# Patient Record
Sex: FEMALE | Race: WHITE | NOT HISPANIC OR LATINO | Employment: OTHER | URBAN - METROPOLITAN AREA
[De-identification: names, ages, dates, MRNs, and addresses within clinical notes are randomized per-mention and may not be internally consistent; named-entity substitution may affect disease eponyms.]

---

## 2022-02-23 ENCOUNTER — OFFICE VISIT (OUTPATIENT)
Dept: FAMILY MEDICINE CLINIC | Facility: CLINIC | Age: 67
End: 2022-02-23

## 2022-02-23 ENCOUNTER — TELEPHONE (OUTPATIENT)
Dept: FAMILY MEDICINE CLINIC | Facility: CLINIC | Age: 67
End: 2022-02-23

## 2022-02-23 VITALS
HEIGHT: 62 IN | WEIGHT: 146 LBS | DIASTOLIC BLOOD PRESSURE: 82 MMHG | RESPIRATION RATE: 18 BRPM | BODY MASS INDEX: 26.87 KG/M2 | HEART RATE: 64 BPM | OXYGEN SATURATION: 96 % | SYSTOLIC BLOOD PRESSURE: 138 MMHG | TEMPERATURE: 97.5 F

## 2022-02-23 DIAGNOSIS — G43.009 MIGRAINE WITHOUT AURA AND WITHOUT STATUS MIGRAINOSUS, NOT INTRACTABLE: ICD-10-CM

## 2022-02-23 DIAGNOSIS — E78.2 MIXED HYPERLIPIDEMIA: ICD-10-CM

## 2022-02-23 DIAGNOSIS — G89.4 CHRONIC PAIN SYNDROME: ICD-10-CM

## 2022-02-23 DIAGNOSIS — G89.29 CHRONIC BILATERAL LOW BACK PAIN WITHOUT SCIATICA: ICD-10-CM

## 2022-02-23 DIAGNOSIS — Z00.00 ENCOUNTER FOR MEDICAL EXAMINATION TO ESTABLISH CARE: ICD-10-CM

## 2022-02-23 DIAGNOSIS — I10 PRIMARY HYPERTENSION: Primary | ICD-10-CM

## 2022-02-23 DIAGNOSIS — Z00.00 ENCOUNTER FOR PREVENTIVE CARE: ICD-10-CM

## 2022-02-23 DIAGNOSIS — N18.31 STAGE 3A CHRONIC KIDNEY DISEASE: ICD-10-CM

## 2022-02-23 DIAGNOSIS — M54.50 CHRONIC BILATERAL LOW BACK PAIN WITHOUT SCIATICA: ICD-10-CM

## 2022-02-23 PROCEDURE — 99203 OFFICE O/P NEW LOW 30 MIN: CPT | Performed by: STUDENT IN AN ORGANIZED HEALTH CARE EDUCATION/TRAINING PROGRAM

## 2022-02-23 RX ORDER — ROSUVASTATIN CALCIUM 5 MG/1
1 TABLET, COATED ORAL DAILY
COMMUNITY
Start: 2022-02-04 | End: 2022-05-05 | Stop reason: SDUPTHER

## 2022-02-23 RX ORDER — MAGNESIUM 30 MG
30 TABLET ORAL
COMMUNITY

## 2022-02-23 RX ORDER — HYDROCHLOROTHIAZIDE 25 MG/1
1 TABLET ORAL DAILY
COMMUNITY
Start: 2022-02-04 | End: 2022-05-05 | Stop reason: SDUPTHER

## 2022-02-23 RX ORDER — HYDROCODONE BITARTRATE AND ACETAMINOPHEN 5; 325 MG/1; MG/1
1 TABLET ORAL EVERY 8 HOURS PRN
COMMUNITY
Start: 2022-02-04 | End: 2022-05-05 | Stop reason: SDUPTHER

## 2022-02-23 NOTE — TELEPHONE ENCOUNTER
Caller: Connie Posey    Relationship: Self    Best call back number: [phone]      What was the call regarding: PATIENT WANTED TO  TO KNOW LAST COLONOSCOPY WAS IN 2017.     Do you require a callback: IF ANY QUESTIONS.

## 2022-02-23 NOTE — TELEPHONE ENCOUNTER
Dr. Chamorro,    Please see patient message below. Let me know if you have any additional questions.      Thanks,  Rick

## 2022-02-23 NOTE — PROGRESS NOTES
Chief Complaint  Pt presented to clinic with   Chief Complaint   Patient presents with   • Establish Care     New Pt           History of Present Illness  Ms. Connie Posey 66-year-old female who presented to the clinic for the first time for establishing medical care.  Patient reported she is originally from [place] and has PCP Dr. Anderson at [place] but due to her grandkids she often visit [place] and would like to establish another doctor here in [place].  Patient reported she has multiple conditions which includes hypertension, cholesterol problems, low back pain and for which she is on hydrocodone as needed.  Patient reported she gets this medication from Dr. Anderson and usually takes medication as needed so she has enough for now and then after few months she is going back to [place] and will see her PCP the ER for schedule 6-month follow-up.  Patient reported she recently recovered from acute bronchitis but still has nasal congestion and for that she is using vicks, Allegra, Mucinex over-the-counter but still has that congestion.  Review of Systems   Constitutional: Negative for activity change, appetite change, fatigue, fever and unexpected weight change.   HENT: Positive for congestion. Negative for postnasal drip, rhinorrhea, sinus pressure, sore throat and voice change.    Respiratory: Negative for cough, chest tightness, shortness of breath and wheezing.    Cardiovascular: Negative for chest pain and palpitations.   Gastrointestinal: Negative for abdominal distention, abdominal pain, diarrhea, nausea and vomiting.   Genitourinary: Negative for difficulty urinating and dysuria.   Musculoskeletal: Positive for arthralgias and back pain. Negative for myalgias.   Skin: Negative for rash.   Neurological: Negative for dizziness, tremors and weakness.   Hematological: Negative for adenopathy.   Psychiatric/Behavioral: Negative for sleep disturbance. The patient is not  "nervous/anxious.        PMH:   Outpatient Medications Prior to Visit   Medication Sig Dispense Refill   • ASPIRIN 81 PO      • Black Pepper-Turmeric 3-500 MG capsule Take  by mouth.     • calcium citrate-vitamin d (CALCITRATE) 315-250 MG-UNIT tablet tablet      • Cyanocobalamin 1000 MCG sublingual tablet Place  under the tongue.     • hydroCHLOROthiazide (HYDRODIURIL) 25 MG tablet Take 1 tablet by mouth Daily.     • HYDROcodone-acetaminophen (NORCO) 5-325 MG per tablet Take 1 tablet by mouth Every 8 (Eight) Hours As Needed.     • magnesium 30 MG tablet Take 30 mg by mouth.     • Multiple Vitamins-Minerals (Eye Vitamins) capsule Take  by mouth.     • PROBIOTIC PRODUCT PO Take  by mouth.     • rosuvastatin (CRESTOR) 5 MG tablet Take 1 tablet by mouth Daily.       No facility-administered medications prior to visit.      Allergies   Allergen Reactions   • Tramadol Other (See Comments)     Not aware of this allergy, but she said her twin does have this reaction to tramadol  Other reaction(s): Chest Pain  Not aware of this allergy, but she said her twin does have this reaction to tramadol     • Sulfa Antibiotics Unknown - High Severity     Other reaction(s): Confusion  \"makes her feel weird\"   • Codeine Confusion     Makes her feel weird and dizzy  Other reaction(s): Confusion  Makes her feel weird and dizzy     • Nitrofurantoin Nausea Only     GI upset   • Propoxyphene Rash     Other reaction(s): Confusion  \"makes her feel weird\"     Past Surgical History:   Procedure Laterality Date   • CHOLECYSTECTOMY     • HYSTERECTOMY       family history includes COPD in her mother; Diabetes in her maternal grandmother; Heart disease in her father and mother; Hypertension in her mother.   reports that she has quit smoking. She has never used smokeless tobacco. She reports current alcohol use. No history on file for drug use.     /82   Pulse 64   Temp 97.5 °F (36.4 °C) (Temporal)   Resp 18   Ht 157.5 cm (62\")   Wt 66.2 kg " (146 lb)   SpO2 96%   BMI 26.70 kg/m²   Physical Exam  Vitals reviewed: recheck /80.   HENT:      Head: Normocephalic and atraumatic.      Mouth/Throat:      Mouth: Mucous membranes are moist.      Pharynx: Oropharynx is clear.   Eyes:      Extraocular Movements: Extraocular movements intact.      Conjunctiva/sclera: Conjunctivae normal.      Pupils: Pupils are equal, round, and reactive to light.   Cardiovascular:      Rate and Rhythm: Normal rate and regular rhythm.   Pulmonary:      Effort: Pulmonary effort is normal.      Breath sounds: Normal breath sounds.   Abdominal:      General: Bowel sounds are normal.      Palpations: Abdomen is soft.   Musculoskeletal:         General: Normal range of motion.      Cervical back: Neck supple.   Skin:     General: Skin is warm.      Capillary Refill: Capillary refill takes less than 2 seconds.   Neurological:      General: No focal deficit present.      Mental Status: She is alert and oriented to person, place, and time. Mental status is at baseline.   Psychiatric:         Mood and Affect: Mood normal.          Diagnoses and all orders for this visit:    1. Primary hypertension (Primary)  Comments:  aspirin 81 mg Po daily, continue hydrochlorthiazide 25 mg Po daily.  Orders:  -     CBC Auto Differential    2. Mixed hyperlipidemia  Comments:  on crestor 5 mg PO daily  Orders:  -     Lipid Panel With / Chol / HDL Ratio  -     Comprehensive Metabolic Panel    3. Chronic bilateral low back pain without sciatica  Comments:  takes hydrocodone 5/325 PO Q8hr as needed, pt is getting this medication from Dr Anderson( [place])    4. Chronic pain syndrome  Comments:  using heating pads and hydrocodone 5/325 mg PO Q8hr as needed for severe pain    5. Encounter for preventive care  Comments:  mammogram feb /2021, reported BiRADS2 , recommend every 2 year, due will be in 2023, due for colonoscopy.    6. Encounter for medical examination to establish care  Comments:  labs  ordered, will follow up.  Orders:  -     Hepatitis C Antibody    7. Stage 3a chronic kidney disease (HCC)  -     Vitamin D 25 Hydroxy    8. Migraine without aura and without status migrainosus, not intractable    Patient does not remember medication for her migraine but reported she has some medication at her home.  No controlled substance agreement was signed in this visit as patient reported she has enough medication for pain and she has a plan to go back to [place] for her 6-month follow-up with Dr. Anderson.  Basic lab work has been ordered today and mammogram was reviewed which was BI-RADS 2 done last year so patient is due for next mammogram next year in 2023.  As per the records last colonoscopy was done in 2006 and reported no polyp and recommended to repeat in 10 years.  As per the patient colonoscopy was done more recent than 2006 so patient will call the office after confirming her last colonoscopy and based on last colonoscopy report will order next colonoscopy.    Needs yearly Flu vaccine  Dental visit and exam every year  Seat Belt always when driving or riding cars  Safe sex life to avoid STD  Healthy heart diet  Exercise 3 times a week    Follow Up  2 weeks

## 2022-03-15 ENCOUNTER — TELEPHONE (OUTPATIENT)
Dept: INTERNAL MEDICINE | Facility: CLINIC | Age: 67
End: 2022-03-15

## 2022-03-15 NOTE — TELEPHONE ENCOUNTER
Caller: Connie Posey    Relationship: Self    Best call back number: [phone]    What is the best time to reach you: ANYTIME     Who are you requesting to speak with (clinical staff, provider,  specific staff member): DR. TAYLOR'S NURSE, NIRMALA    Do you know the name of the person who called:     What was the call regarding: HER UPCOMING VISIT    Do you require a callback: YES

## 2022-04-05 PROBLEM — H02.836 DERMATOCHALASIS OF BOTH EYELIDS: Status: ACTIVE | Noted: 2018-07-17

## 2022-04-05 PROBLEM — H25.10 NUCLEAR SCLEROTIC CATARACT: Status: ACTIVE | Noted: 2019-03-06

## 2022-04-05 PROBLEM — H43.813 PVD (POSTERIOR VITREOUS DETACHMENT), BILATERAL: Status: ACTIVE | Noted: 2018-07-17

## 2022-04-05 PROBLEM — H02.833 DERMATOCHALASIS OF BOTH EYELIDS: Status: ACTIVE | Noted: 2018-07-17

## 2022-04-05 RX ORDER — TERBINAFINE HYDROCHLORIDE 250 MG/1
250 TABLET ORAL
COMMUNITY
Start: 2021-12-13 | End: 2022-04-06

## 2022-04-06 ENCOUNTER — OFFICE VISIT (OUTPATIENT)
Dept: INTERNAL MEDICINE | Facility: CLINIC | Age: 67
End: 2022-04-06

## 2022-04-06 VITALS
RESPIRATION RATE: 16 BRPM | OXYGEN SATURATION: 95 % | BODY MASS INDEX: 26.91 KG/M2 | HEART RATE: 83 BPM | HEIGHT: 62 IN | DIASTOLIC BLOOD PRESSURE: 87 MMHG | SYSTOLIC BLOOD PRESSURE: 122 MMHG | WEIGHT: 146.2 LBS

## 2022-04-06 DIAGNOSIS — I10 BENIGN ESSENTIAL HYPERTENSION: Primary | Chronic | ICD-10-CM

## 2022-04-06 DIAGNOSIS — Z86.010 HISTORY OF COLON POLYPS: Chronic | ICD-10-CM

## 2022-04-06 DIAGNOSIS — Z51.81 THERAPEUTIC DRUG MONITORING: ICD-10-CM

## 2022-04-06 DIAGNOSIS — E78.2 MIXED HYPERLIPIDEMIA: Chronic | ICD-10-CM

## 2022-04-06 DIAGNOSIS — E53.8 VITAMIN B12 DEFICIENCY: Chronic | ICD-10-CM

## 2022-04-06 DIAGNOSIS — Z91.09 MULTIPLE ENVIRONMENTAL ALLERGIES: Chronic | ICD-10-CM

## 2022-04-06 DIAGNOSIS — E55.9 VITAMIN D DEFICIENCY: Chronic | ICD-10-CM

## 2022-04-06 DIAGNOSIS — J30.1 SEASONAL ALLERGIC RHINITIS DUE TO POLLEN: Chronic | ICD-10-CM

## 2022-04-06 DIAGNOSIS — Z11.59 NEED FOR HEPATITIS C SCREENING TEST: ICD-10-CM

## 2022-04-06 PROBLEM — Z86.0100 HISTORY OF COLON POLYPS: Status: ACTIVE | Noted: 2022-04-06

## 2022-04-06 PROBLEM — H02.836 DERMATOCHALASIS OF BOTH EYELIDS: Chronic | Status: ACTIVE | Noted: 2018-07-17

## 2022-04-06 PROBLEM — Z86.0100 HISTORY OF COLON POLYPS: Chronic | Status: ACTIVE | Noted: 2022-04-06

## 2022-04-06 PROBLEM — H43.813 PVD (POSTERIOR VITREOUS DETACHMENT), BILATERAL: Chronic | Status: ACTIVE | Noted: 2018-07-17

## 2022-04-06 PROBLEM — H02.833 DERMATOCHALASIS OF BOTH EYELIDS: Chronic | Status: ACTIVE | Noted: 2018-07-17

## 2022-04-06 PROBLEM — H25.13 NUCLEAR SCLEROTIC CATARACT OF BOTH EYES: Chronic | Status: ACTIVE | Noted: 2019-03-06

## 2022-04-06 PROBLEM — H25.13 NUCLEAR SCLEROTIC CATARACT OF BOTH EYES: Status: ACTIVE | Noted: 2019-03-06

## 2022-04-06 PROCEDURE — 99214 OFFICE O/P EST MOD 30 MIN: CPT | Performed by: INTERNAL MEDICINE

## 2022-04-06 NOTE — PROGRESS NOTES
04/06/2022    Patient Information  Connie Posey                                                                                          [address] [facility] [address]      1955  [unfilled]  There is no work phone number on file.    Chief Complaint:     New patient to get established.  Wants to discuss cholesterol and blood pressure as well as other issues.    History of Present Illness:    66-year-old white female patient is here to get established with me as her primary care physician/Internal Medicine physician.  She is not new to the system as she saw one of the other doctors in our group not long ago.  Lab work was scheduled but this was never done.  Patient is acquainted with 2 of my other patients and they made the referral.  She is moving here from [place].  She does not have any acute complaints at the present time.  She has high blood pressure and is treated with hydrochlorothiazide and reports that her blood pressure seems to go up and fluctuate when her weight goes up.  She also has  Hyperlipidemia for which she takes 5 mg of Crestor.  She is taking vitamin B12 sublingually but is not sure if there is a documented vitamin B12 deficiency.      Review of Systems   Constitutional: Negative.   HENT: Negative.    Eyes: Negative.    Cardiovascular: Negative.    Respiratory: Negative.    Endocrine: Negative.    Hematologic/Lymphatic: Negative.    Skin: Negative.    Musculoskeletal: Positive for arthritis and joint pain.   Gastrointestinal: Negative.    Genitourinary: Negative.    Neurological: Negative.    Psychiatric/Behavioral: Negative.    Allergic/Immunologic: Negative.        Active Problems:    Patient Active Problem List   Diagnosis   • Allergic rhinitis   • Chronic midline low back pain without sciatica   • Dermatochalasis of both eyelids   • Benign essential hypertension   • Microscopic hematuria   • Chronic migraine w/o aura w/o status migrainosus, not  intractable   • Hyperlipidemia   • Primary osteoarthritis of both hands   • Seborrheic dermatitis   • PVD (posterior vitreous detachment), bilateral   • Nuclear sclerotic cataract of both eyes   • Multiple environmental allergies   • Vitamin D deficiency   • Therapeutic drug monitoring   • Vitamin B12 deficiency   • History of colon polyps         Past Medical History:   Diagnosis Date   • Allergic rhinitis 12/17/2010   • Benign essential hypertension 12/17/2010    Formatting of this note might be different from the original. Hypertension Formatting of this note might be different from the original. Hypertension   • Chronic midline low back pain without sciatica 11/21/2013   • Chronic migraine w/o aura w/o status migrainosus, not intractable 9/16/2011   • Dermatochalasis of both eyelids 7/17/2018   • History of colon polyps 4/6/2022   • Hyperlipidemia 12/23/2010   • Microscopic hematuria 2/11/2011   • Nuclear sclerotic cataract of both eyes 3/6/2019   • Primary osteoarthritis of both hands 8/13/2010    Formatting of this note might be different from the original. Osteoarthritis Of The Hand - Bilateral  10/1 IMO update Formatting of this note might be different from the original. Osteoarthritis Of The Hand - Bilateral  10/1 IMO update   • PVD (posterior vitreous detachment), bilateral 7/17/2018   • Seborrheic dermatitis 12/17/2010    Formatting of this note might be different from the original. Seborrheic Dermatitis  10/1 IMO update Formatting of this note might be different from the original. Seborrheic Dermatitis  10/1 IMO update   • Vitamin B12 deficiency 4/6/2022   • Vitamin D deficiency 4/6/2022         Past Surgical History:   Procedure Laterality Date   • CHOLECYSTECTOMY N/A    • COLONOSCOPY W/ BIOPSIES N/A 03/17/2017 March 17, 2017--colonoscopy   • VAGINAL HYSTERECTOMY N/A     Age 30--partial vaginal hysterectomy.  Ovaries intact.         Allergies   Allergen Reactions   • Tramadol Other (See Comments)      "Not aware of this allergy, but she said her twin does have this reaction to tramadol  Other reaction(s): Chest Pain  Not aware of this allergy, but she said her twin does have this reaction to tramadol     • Sulfa Antibiotics Unknown - High Severity     Other reaction(s): Confusion  \"makes her feel weird\"   • Codeine Confusion     Makes her feel weird and dizzy  Other reaction(s): Confusion  Makes her feel weird and dizzy     • Nitrofurantoin Nausea Only     GI upset   • Propoxyphene Rash     Other reaction(s): Confusion  \"makes her feel weird\"           Current Outpatient Medications:   •  ASPIRIN 81 PO, , Disp: , Rfl:   •  Black Pepper-Turmeric 3-500 MG capsule, Take  by mouth., Disp: , Rfl:   •  Cyanocobalamin 1000 MCG sublingual tablet, Place  under the tongue., Disp: , Rfl:   •  hydroCHLOROthiazide (HYDRODIURIL) 25 MG tablet, Take 1 tablet by mouth Daily., Disp: , Rfl:   •  magnesium 30 MG tablet, Take 30 mg by mouth., Disp: , Rfl:   •  Multiple Vitamins-Minerals (Eye Vitamins) capsule, Take  by mouth., Disp: , Rfl:   •  PROBIOTIC PRODUCT PO, Take  by mouth., Disp: , Rfl:   •  rosuvastatin (CRESTOR) 5 MG tablet, Take 1 tablet by mouth Daily., Disp: , Rfl:   •  terbinafine (lamiSIL) 250 MG tablet, Take 250 mg by mouth., Disp: , Rfl:       Family History   Problem Relation Age of Onset   • Hypertension Mother    • COPD Mother    • Heart disease Mother    • Heart disease Father    • Diabetes Maternal Grandmother          Social History     Socioeconomic History   • Marital status: Single   Tobacco Use   • Smoking status: Former Smoker     Packs/day: 1.50     Years: 13.00     Pack years: 19.50     Start date: 1972     Quit date: 1985     Years since quittin.2   • Smokeless tobacco: Never Used   Vaping Use   • Vaping Use: Never used   Substance and Sexual Activity   • Alcohol use: Yes   • Drug use: Never   • Sexual activity: Defer         Vitals:    22 1212   BP: 122/87   BP Location: Right arm " "  Patient Position: Sitting   Cuff Size: Adult   Pulse: 83   Resp: 16   SpO2: 95%   Weight: 66.3 kg (146 lb 3.2 oz)   Height: 157.5 cm (62\")        Body mass index is 26.74 kg/m².      Physical Exam:    General: Alert and oriented x 3.  No acute distress.  Overweight.  Normal affect.  HEENT: Pupils equal, round, reactive to light; extraocular movements intact; sclerae nonicteric; pharynx, ear canals and TMs normal.  Neck: Without JVD, thyromegaly, bruit, or adenopathy.  Lungs: Clear to auscultation in all fields.  Heart: Regular rate and rhythm without murmur, rub, gallop, or click.  Abdomen: Soft, nontender, without hepatosplenomegaly or hernia.  Bowel sounds normal.  : Deferred.  Rectal: Deferred.  Extremities: Without clubbing, cyanosis, edema, or pulse deficit.  Neurologic: Intact without focal deficit.  Normal station and gait observed during ingress and egress from the examination room.  Skin: Without significant lesion.  Musculoskeletal: Unremarkable.    Lab/other results:      Assessment/Plan:     Diagnosis Plan   1. Benign essential hypertension  Comprehensive Metabolic Panel   2. Hyperlipidemia  CK    Comprehensive Metabolic Panel    NMR LipoProfile    TSH    T4, Free    T3, Free    Homocysteine    Folate   3. Multiple environmental allergies     4. Seasonal allergic rhinitis due to pollen     5. Vitamin D deficiency  Vitamin D 25 Hydroxy   6. History of colon polyps     7. Vitamin B12 deficiency  CBC (No Diff)    Methylmalonic Acid, Serum    Vitamin B12   8. Therapeutic drug monitoring  Urinalysis With Microscopic If Indicated (No Culture) - Urine, Clean Catch   9. Need for hepatitis C screening test  Hepatitis C Antibody     Patient has hypertension that does not appear to be controlled.  However, I am not going to make any changes at the present time based on this 1 reading.  She is on hydrochlorothiazide alone without potassium supplementation and certainly we need to assess her electrolyte status.  " She does not have any symptoms currently of hypokalemia.  She has hyperlipidemia that needs to be assessed with an NMR.  Her environmental allergies/allergic rhinitis seem to be reasonably controlled at present time.  Her vitamin D needs to be assessed given her postmenopausal status.  She needs a DEXA scan which we will schedule sometime here in the near future.  She also has a history of colon polyps and is due soon for a colonoscopy.  She is not sure if she should have this done in [place] by the previous doctor whether or not she wants to have someone here in [place] do it.  I explained to her that it is not something we have to make an immediate decision up on.    Plan is as follows: We will set up a follow-up appointment with fasting lab prior and this will also be a subsequent Medicare wellness visit.  Patient does not need any refills at this time.        Procedures

## 2022-04-28 ENCOUNTER — LAB (OUTPATIENT)
Dept: LAB | Facility: HOSPITAL | Age: 67
End: 2022-04-28

## 2022-04-28 DIAGNOSIS — Z11.59 NEED FOR HEPATITIS C SCREENING TEST: ICD-10-CM

## 2022-04-28 DIAGNOSIS — E55.9 VITAMIN D DEFICIENCY: Chronic | ICD-10-CM

## 2022-04-28 DIAGNOSIS — E53.8 VITAMIN B12 DEFICIENCY: Chronic | ICD-10-CM

## 2022-04-28 DIAGNOSIS — Z51.81 THERAPEUTIC DRUG MONITORING: ICD-10-CM

## 2022-04-28 DIAGNOSIS — E78.2 MIXED HYPERLIPIDEMIA: Chronic | ICD-10-CM

## 2022-04-28 DIAGNOSIS — I10 BENIGN ESSENTIAL HYPERTENSION: Chronic | ICD-10-CM

## 2022-04-28 LAB
25(OH)D3 SERPL-MCNC: 45 NG/ML (ref 30–100)
ALBUMIN SERPL-MCNC: 4.4 G/DL (ref 3.5–5.2)
ALBUMIN/GLOB SERPL: 1.4 G/DL
ALP SERPL-CCNC: 61 U/L (ref 39–117)
ALT SERPL W P-5'-P-CCNC: 15 U/L (ref 1–33)
ANION GAP SERPL CALCULATED.3IONS-SCNC: 11.3 MMOL/L (ref 5–15)
AST SERPL-CCNC: 13 U/L (ref 1–32)
BACTERIA UR QL AUTO: ABNORMAL /HPF
BILIRUB SERPL-MCNC: 0.9 MG/DL (ref 0–1.2)
BILIRUB UR QL STRIP: NEGATIVE
BUN SERPL-MCNC: 17 MG/DL (ref 8–23)
BUN/CREAT SERPL: 18.3 (ref 7–25)
CALCIUM SPEC-SCNC: 9.8 MG/DL (ref 8.6–10.5)
CHLORIDE SERPL-SCNC: 99 MMOL/L (ref 98–107)
CK SERPL-CCNC: 55 U/L (ref 20–180)
CLARITY UR: CLEAR
CO2 SERPL-SCNC: 31.7 MMOL/L (ref 22–29)
COLOR UR: YELLOW
CREAT SERPL-MCNC: 0.93 MG/DL (ref 0.57–1)
DEPRECATED RDW RBC AUTO: 38.9 FL (ref 37–54)
EGFRCR SERPLBLD CKD-EPI 2021: 67.1 ML/MIN/1.73
ERYTHROCYTE [DISTWIDTH] IN BLOOD BY AUTOMATED COUNT: 12.6 % (ref 12.3–15.4)
FOLATE SERPL-MCNC: 10.8 NG/ML (ref 4.78–24.2)
GLOBULIN UR ELPH-MCNC: 3.2 GM/DL
GLUCOSE SERPL-MCNC: 98 MG/DL (ref 65–99)
GLUCOSE UR STRIP-MCNC: NEGATIVE MG/DL
HCT VFR BLD AUTO: 46.1 % (ref 34–46.6)
HCV AB SER DONR QL: NORMAL
HCYS SERPL-MCNC: 9 UMOL/L (ref 0–15)
HGB BLD-MCNC: 14.9 G/DL (ref 12–15.9)
HGB UR QL STRIP.AUTO: ABNORMAL
HYALINE CASTS UR QL AUTO: ABNORMAL /LPF
KETONES UR QL STRIP: NEGATIVE
LEUKOCYTE ESTERASE UR QL STRIP.AUTO: NEGATIVE
MCH RBC QN AUTO: 27.3 PG (ref 26.6–33)
MCHC RBC AUTO-ENTMCNC: 32.3 G/DL (ref 31.5–35.7)
MCV RBC AUTO: 84.4 FL (ref 79–97)
NITRITE UR QL STRIP: NEGATIVE
PH UR STRIP.AUTO: 8 [PH] (ref 5–8)
PLATELET # BLD AUTO: 223 10*3/MM3 (ref 140–450)
PMV BLD AUTO: 9.4 FL (ref 6–12)
POTASSIUM SERPL-SCNC: 3.8 MMOL/L (ref 3.5–5.2)
PROT SERPL-MCNC: 7.6 G/DL (ref 6–8.5)
PROT UR QL STRIP: NEGATIVE
RBC # BLD AUTO: 5.46 10*6/MM3 (ref 3.77–5.28)
RBC # UR STRIP: ABNORMAL /HPF
REF LAB TEST METHOD: ABNORMAL
SODIUM SERPL-SCNC: 142 MMOL/L (ref 136–145)
SP GR UR STRIP: 1.02 (ref 1–1.03)
SQUAMOUS #/AREA URNS HPF: ABNORMAL /HPF
T3FREE SERPL-MCNC: 3.27 PG/ML (ref 2–4.4)
T4 FREE SERPL-MCNC: 1.44 NG/DL (ref 0.93–1.7)
TSH SERPL DL<=0.05 MIU/L-ACNC: 1.49 UIU/ML (ref 0.27–4.2)
UROBILINOGEN UR QL STRIP: ABNORMAL
VIT B12 BLD-MCNC: >2000 PG/ML (ref 211–946)
WBC # UR STRIP: ABNORMAL /HPF
WBC NRBC COR # BLD: 5.08 10*3/MM3 (ref 3.4–10.8)

## 2022-04-28 PROCEDURE — 85027 COMPLETE CBC AUTOMATED: CPT

## 2022-04-28 PROCEDURE — 81001 URINALYSIS AUTO W/SCOPE: CPT

## 2022-04-28 PROCEDURE — 84443 ASSAY THYROID STIM HORMONE: CPT

## 2022-04-28 PROCEDURE — 82550 ASSAY OF CK (CPK): CPT

## 2022-04-28 PROCEDURE — 82746 ASSAY OF FOLIC ACID SERUM: CPT

## 2022-04-28 PROCEDURE — 84481 FREE ASSAY (FT-3): CPT

## 2022-04-28 PROCEDURE — 80061 LIPID PANEL: CPT

## 2022-04-28 PROCEDURE — 80053 COMPREHEN METABOLIC PANEL: CPT

## 2022-04-28 PROCEDURE — 83921 ORGANIC ACID SINGLE QUANT: CPT

## 2022-04-28 PROCEDURE — 86803 HEPATITIS C AB TEST: CPT

## 2022-04-28 PROCEDURE — 82607 VITAMIN B-12: CPT

## 2022-04-28 PROCEDURE — 83090 ASSAY OF HOMOCYSTEINE: CPT

## 2022-04-28 PROCEDURE — 82306 VITAMIN D 25 HYDROXY: CPT

## 2022-04-28 PROCEDURE — 84439 ASSAY OF FREE THYROXINE: CPT

## 2022-04-28 PROCEDURE — 36415 COLL VENOUS BLD VENIPUNCTURE: CPT

## 2022-04-28 PROCEDURE — 83704 LIPOPROTEIN BLD QUAN PART: CPT

## 2022-04-30 LAB
CHOLEST SERPL-MCNC: 138 MG/DL (ref 100–199)
HDL SERPL-SCNC: 25.7 UMOL/L
HDLC SERPL-MCNC: 35 MG/DL
LDL SERPL QN: 20.7 NM
LDL SERPL-SCNC: 1190 NMOL/L
LDL SMALL SERPL-SCNC: 518 NMOL/L
LDLC SERPL CALC-MCNC: 82 MG/DL (ref 0–99)
TRIGL SERPL-MCNC: 116 MG/DL (ref 0–149)

## 2022-05-05 ENCOUNTER — OFFICE VISIT (OUTPATIENT)
Dept: INTERNAL MEDICINE | Facility: CLINIC | Age: 67
End: 2022-05-05

## 2022-05-05 VITALS
OXYGEN SATURATION: 96 % | SYSTOLIC BLOOD PRESSURE: 126 MMHG | HEART RATE: 93 BPM | BODY MASS INDEX: 25.32 KG/M2 | RESPIRATION RATE: 18 BRPM | DIASTOLIC BLOOD PRESSURE: 72 MMHG | HEIGHT: 62 IN | WEIGHT: 137.6 LBS

## 2022-05-05 DIAGNOSIS — Z00.00 ENCOUNTER FOR SUBSEQUENT ANNUAL WELLNESS VISIT (AWV) IN MEDICARE PATIENT: Primary | ICD-10-CM

## 2022-05-05 DIAGNOSIS — M19.042 PRIMARY OSTEOARTHRITIS OF BOTH HANDS: Chronic | ICD-10-CM

## 2022-05-05 DIAGNOSIS — Z86.010 HISTORY OF COLON POLYPS: Chronic | ICD-10-CM

## 2022-05-05 DIAGNOSIS — H43.813 PVD (POSTERIOR VITREOUS DETACHMENT), BILATERAL: Chronic | ICD-10-CM

## 2022-05-05 DIAGNOSIS — R31.29 MICROSCOPIC HEMATURIA: Chronic | ICD-10-CM

## 2022-05-05 DIAGNOSIS — Z51.81 THERAPEUTIC DRUG MONITORING: ICD-10-CM

## 2022-05-05 DIAGNOSIS — E53.8 VITAMIN B12 DEFICIENCY: Chronic | ICD-10-CM

## 2022-05-05 DIAGNOSIS — Z12.11 COLON CANCER SCREENING: ICD-10-CM

## 2022-05-05 DIAGNOSIS — E55.9 VITAMIN D DEFICIENCY: Chronic | ICD-10-CM

## 2022-05-05 DIAGNOSIS — M19.041 PRIMARY OSTEOARTHRITIS OF BOTH HANDS: Chronic | ICD-10-CM

## 2022-05-05 DIAGNOSIS — G43.709 CHRONIC MIGRAINE W/O AURA W/O STATUS MIGRAINOSUS, NOT INTRACTABLE: Chronic | ICD-10-CM

## 2022-05-05 DIAGNOSIS — H25.13 NUCLEAR SCLEROTIC CATARACT OF BOTH EYES: Chronic | ICD-10-CM

## 2022-05-05 DIAGNOSIS — Z91.09 MULTIPLE ENVIRONMENTAL ALLERGIES: Chronic | ICD-10-CM

## 2022-05-05 DIAGNOSIS — E78.2 MIXED HYPERLIPIDEMIA: Chronic | ICD-10-CM

## 2022-05-05 DIAGNOSIS — I10 BENIGN ESSENTIAL HYPERTENSION: Chronic | ICD-10-CM

## 2022-05-05 DIAGNOSIS — Z87.442 HISTORY OF KIDNEY STONES: ICD-10-CM

## 2022-05-05 LAB — METHYLMALONATE SERPL-SCNC: 149 NMOL/L (ref 0–378)

## 2022-05-05 PROCEDURE — G0402 INITIAL PREVENTIVE EXAM: HCPCS | Performed by: INTERNAL MEDICINE

## 2022-05-05 PROCEDURE — 1170F FXNL STATUS ASSESSED: CPT | Performed by: INTERNAL MEDICINE

## 2022-05-05 PROCEDURE — 1159F MED LIST DOCD IN RCRD: CPT | Performed by: INTERNAL MEDICINE

## 2022-05-05 PROCEDURE — 99214 OFFICE O/P EST MOD 30 MIN: CPT | Performed by: INTERNAL MEDICINE

## 2022-05-05 RX ORDER — ROSUVASTATIN CALCIUM 5 MG/1
TABLET, COATED ORAL
Qty: 90 TABLET | Refills: 3 | Status: SHIPPED | OUTPATIENT
Start: 2022-05-05

## 2022-05-05 RX ORDER — HYDROCODONE BITARTRATE AND ACETAMINOPHEN 5; 325 MG/1; MG/1
TABLET ORAL
Qty: 90 TABLET | Refills: 0
Start: 2022-05-05 | End: 2022-05-31 | Stop reason: SDUPTHER

## 2022-05-05 RX ORDER — HYDROCHLOROTHIAZIDE 25 MG/1
TABLET ORAL
Qty: 90 TABLET | Refills: 3 | Status: SHIPPED | OUTPATIENT
Start: 2022-05-05

## 2022-05-05 NOTE — PROGRESS NOTES
05/05/2022    Patient Information  Connie Posey                                                                                          [address] [place] [address]      1955  [unfilled]  There is no work phone number on file.    Chief Complaint:     Subsequent Medicare wellness visit.  Follow-up lab work in order to monitor chronic medical issues listed in history of present illness.    History of Present Illness:    Patient with a history of hyperlipidemia, hypertension, vitamin D and vitamin B12 deficiency, environmental allergies, history of colon polyps, history of kidney stones and microscopic hematuria, migraine headaches, osteoarthritis particular involving the hands, cataracts and posterior vitreous detachment.  She presents today for subsequent Medicare wellness visit and to follow-up lab work in order to monitor chronic medical issues.  Her past medical history reviewed and updated were necessary including health maintenance parameters.  This reveals she will be up-to-date or else accounted for after today's visit.  Specifically, she needs a colonoscopy with and I will place the referral today.    Review of Systems   Constitutional: Negative.   HENT: Negative.    Eyes: Negative.    Cardiovascular: Negative.    Respiratory: Negative.    Endocrine: Negative.    Hematologic/Lymphatic: Negative.    Skin: Negative.    Musculoskeletal: Positive for arthritis and joint pain.   Gastrointestinal: Negative.    Genitourinary: Negative.    Neurological: Negative.    Psychiatric/Behavioral: Negative.    Allergic/Immunologic: Negative.        Active Problems:    Patient Active Problem List   Diagnosis   • Allergic rhinitis   • Chronic midline low back pain without sciatica   • Dermatochalasis of both eyelids   • Benign essential hypertension   • Microscopic hematuria   • Chronic migraine w/o aura w/o status migrainosus, not intractable   • Hyperlipidemia   • Primary osteoarthritis of  both hands   • Seborrheic dermatitis   • PVD (posterior vitreous detachment), bilateral   • Nuclear sclerotic cataract of both eyes   • Multiple environmental allergies   • Vitamin D deficiency   • Therapeutic drug monitoring   • Vitamin B12 deficiency   • History of colon polyps   • History of kidney stones         Past Medical History:   Diagnosis Date   • Allergic rhinitis 12/17/2010   • Benign essential hypertension 12/17/2010    Formatting of this note might be different from the original. Hypertension Formatting of this note might be different from the original. Hypertension   • Chronic midline low back pain without sciatica 11/21/2013   • Chronic migraine w/o aura w/o status migrainosus, not intractable 9/16/2011   • Dermatochalasis of both eyelids 7/17/2018   • History of colon polyps 4/6/2022   • History of kidney stones 5/5/2022   • Hyperlipidemia 12/23/2010   • Microscopic hematuria 2/11/2011   • Nuclear sclerotic cataract of both eyes 3/6/2019   • Primary osteoarthritis of both hands 8/13/2010    Formatting of this note might be different from the original. Osteoarthritis Of The Hand - Bilateral  10/1 IMO update Formatting of this note might be different from the original. Osteoarthritis Of The Hand - Bilateral  10/1 IMO update   • PVD (posterior vitreous detachment), bilateral 7/17/2018   • Seborrheic dermatitis 12/17/2010    Formatting of this note might be different from the original. Seborrheic Dermatitis  10/1 IMO update Formatting of this note might be different from the original. Seborrheic Dermatitis  10/1 IMO update   • Vitamin B12 deficiency 4/6/2022   • Vitamin D deficiency 4/6/2022         Past Surgical History:   Procedure Laterality Date   • CHOLECYSTECTOMY N/A    • COLONOSCOPY W/ BIOPSIES N/A 03/17/2017 March 17, 2017--colonoscopy   • VAGINAL HYSTERECTOMY N/A     Age 30--partial vaginal hysterectomy.  Ovaries intact.         Allergies   Allergen Reactions   • Tramadol Other (See  "Comments)     Not aware of this allergy, but she said her twin does have this reaction to tramadol  Other reaction(s): Chest Pain  Not aware of this allergy, but she said her twin does have this reaction to tramadol     • Sulfa Antibiotics Unknown - High Severity     Other reaction(s): Confusion  \"makes her feel weird\"   • Codeine Confusion     Makes her feel weird and dizzy  Other reaction(s): Confusion  Makes her feel weird and dizzy     • Nitrofurantoin Nausea Only     GI upset   • Propoxyphene Rash     Other reaction(s): Confusion  \"makes her feel weird\"           Current Outpatient Medications:   •  Black Pepper-Turmeric 3-500 MG capsule, Take  by mouth., Disp: , Rfl:   •  Cyanocobalamin 1000 MCG sublingual tablet, Place  under the tongue., Disp: , Rfl:   •  hydroCHLOROthiazide (HYDRODIURIL) 25 MG tablet, Take 1 p.o. every morning for high blood pressure, Disp: 90 tablet, Rfl: 3  •  HYDROcodone-acetaminophen (NORCO) 5-325 MG per tablet, Take 1 p.o. 3 times daily as needed arthritic pain., Disp: 90 tablet, Rfl: 0  •  magnesium 30 MG tablet, Take 30 mg by mouth., Disp: , Rfl:   •  Multiple Vitamins-Minerals (Eye Vitamins) capsule, Take  by mouth., Disp: , Rfl:   •  PROBIOTIC PRODUCT PO, Take  by mouth., Disp: , Rfl:   •  rosuvastatin (CRESTOR) 5 MG tablet, Take 1 p.o. daily for high cholesterol, Disp: 90 tablet, Rfl: 3      Family History   Problem Relation Age of Onset   • Hypertension Mother    • COPD Mother    • Heart disease Mother    • Heart disease Father    • Diabetes Maternal Grandmother          Social History     Socioeconomic History   • Marital status: Single   Tobacco Use   • Smoking status: Former Smoker     Packs/day: 1.50     Years: 13.00     Pack years: 19.50     Start date: 1972     Quit date: 1985     Years since quittin.3   • Smokeless tobacco: Never Used   Vaping Use   • Vaping Use: Never used   Substance and Sexual Activity   • Alcohol use: Yes   • Drug use: Never   • Sexual " "activity: Defer         Vitals:    05/05/22 0955   BP: 126/72   Pulse: 93   Resp: 18   SpO2: 96%   Weight: 62.4 kg (137 lb 9.6 oz)   Height: 157.5 cm (62\")        Body mass index is 25.17 kg/m².      Physical Exam:    General: Alert and oriented x 3.  No acute distress.  Normal affect.  HEENT: Pupils equal, round, reactive to light; extraocular movements intact; sclerae nonicteric; pharynx, ear canals and TMs normal.  Neck: Without JVD, thyromegaly, bruit, or adenopathy.  Lungs: Clear to auscultation in all fields.  Heart: Regular rate and rhythm without murmur, rub, gallop, or click.  Abdomen: Soft, nontender, without hepatosplenomegaly or hernia.  Bowel sounds normal.  : Deferred.  Rectal: Deferred.  Extremities: Without clubbing, cyanosis, edema, or pulse deficit.  Neurologic: Intact without focal deficit.  Normal station and gait observed during ingress and egress from the examination room.  Skin: Without significant lesion.  Musculoskeletal: Unremarkable.    Lab/other results:    Folic acid normal at 10.8.  CBC is normal.  CPK normal.  CMP normal.  NMR reveals a total cholesterol of 138, triglycerides 116, LDL particle number mildly elevated 1190, small LDL particle #518.  HDL particle number little low at 25.7.  Urinalysis reveals 6-12 red blood cells, 0 white cells, 0 bacteria.  Thyroid function tests are normal.  Hepatitis C antibody screening is negative.  Homocystine is normal at 9.0.  Methylmalonic acid is pending.  Vitamin B12 is greater than 2000.    Assessment/Plan:     Diagnosis Plan   1. Encounter for subsequent annual wellness visit (AWV) in Medicare patient     2. Hyperlipidemia  CK    Comprehensive Metabolic Panel    NMR LipoProfile    TSH    T4, Free    T3, Free    rosuvastatin (CRESTOR) 5 MG tablet   3. Benign essential hypertension  hydroCHLOROthiazide (HYDRODIURIL) 25 MG tablet   4. Vitamin D deficiency  Vitamin D 25 Hydroxy   5. Vitamin B12 deficiency  CBC (No Diff)    Methylmalonic Acid, " Serum   6. Multiple environmental allergies     7. History of colon polyps  Ambulatory Referral For Screening Colonoscopy   8. Microscopic hematuria  US Renal Bilateral    Urinalysis With Microscopic If Indicated (No Culture) - Urine, Clean Catch   9. History of kidney stones     10. Chronic migraine w/o aura w/o status migrainosus, not intractable     11. Primary osteoarthritis of both hands  HYDROcodone-acetaminophen (NORCO) 5-325 MG per tablet   12. PVD (posterior vitreous detachment), bilateral     13. Nuclear sclerotic cataract of both eyes     14. Therapeutic drug monitoring     15. Colon cancer screening  Ambulatory Referral For Screening Colonoscopy     The subsequent Medicare wellness visit is documented on separate note.    Patient has hyperlipidemia which is under good control on a small dose of Crestor.  She tolerates it well.  Her blood pressure seems to be controlled with hydrochlorothiazide and she does not have any electrolyte abnormalities.  Her renal function is normal.  Her vitamin D is in the normal range with supplementation.  Her vitamin B12 deficiency looks to be well corrected with sublingual vitamin B12.  There is no evidence of folic acid deficiency.  She has a history of colon polyps and is due for colon cancer screening.  Her environmental allergies currently are not a big issue.  Her migraines seem to be stable.  She has primary osteoarthritis of both hands and expected problems related to this.  She sees the eye doctor for cataracts and posterior vitreous detachment.  Patient has had a DEXA scan which was done in October 2020.  DEXA scan revealed lumbar spine T score -0.5.  Left femoral neck T score -0.1.  Right femoral neck T score -0.2 which is essentially normal.    Plan is as follows: Colonoscopy ordered.  DEXA scan ordered.  Patient will follow-up in 1 year with lab prior for her subsequent Medicare wellness visit and follow-up.  Otherwise she will follow-up as needed.    Addendum:  Apparently patient has not had any sort of imaging to evaluate for microscopic hematuria.  I think an ultrasound would be reasonable to rule out renal mass.  She does have a history of kidney stones and not likely the etiology of the microscopic hematuria.      Procedures

## 2022-05-05 NOTE — PROGRESS NOTES
The ABCs of the Annual Wellness Visit  Subsequent Medicare Wellness Visit    No chief complaint on file.     Subjective    History of Present Illness:  Connie Posey is a 66 y.o. female who presents for a Subsequent Medicare Wellness Visit.    The following portions of the patient's history were reviewed and   updated as appropriate: allergies, current medications, past family history, past medical history, past social history, past surgical history and problem list.    Compared to one year ago, the patient feels her physical   health is better.    Compared to one year ago, the patient feels her mental   health is the same.    Recent Hospitalizations:  She was not admitted to the hospital during the last year.       Current Medical Providers:  Patient Care Team:  Lenny Guadarrama MD as PCP - General (Internal Medicine)    Outpatient Medications Prior to Visit   Medication Sig Dispense Refill   • Black Pepper-Turmeric 3-500 MG capsule Take  by mouth.     • Cyanocobalamin 1000 MCG sublingual tablet Place  under the tongue.     • magnesium 30 MG tablet Take 30 mg by mouth.     • Multiple Vitamins-Minerals (Eye Vitamins) capsule Take  by mouth.     • PROBIOTIC PRODUCT PO Take  by mouth.     • ASPIRIN 81 PO      • hydroCHLOROthiazide (HYDRODIURIL) 25 MG tablet Take 1 tablet by mouth Daily.     • rosuvastatin (CRESTOR) 5 MG tablet Take 1 tablet by mouth Daily.       No facility-administered medications prior to visit.       Opioid medication/s are on active medication list.  and I have evaluated her active treatment plan and pain score trends (see table).  There were no vitals filed for this visit.  I have reviewed the chart for potential of high risk medication and harmful drug interactions in the elderly.            Aspirin is on active medication list. Aspirin use is not indicated based on review of current medical condition/s. Risk of harm outweighs potential benefits. Patient instructed to discontinue this  "medication.  .      Patient Active Problem List   Diagnosis   • Allergic rhinitis   • Chronic midline low back pain without sciatica   • Dermatochalasis of both eyelids   • Benign essential hypertension   • Microscopic hematuria   • Chronic migraine w/o aura w/o status migrainosus, not intractable   • Hyperlipidemia   • Primary osteoarthritis of both hands   • Seborrheic dermatitis   • PVD (posterior vitreous detachment), bilateral   • Nuclear sclerotic cataract of both eyes   • Multiple environmental allergies   • Vitamin D deficiency   • Therapeutic drug monitoring   • Vitamin B12 deficiency   • History of colon polyps   • History of kidney stones     Advance Care Planning  Advance Directive is not on file.  ACP discussion was held with the patient during this visit. Patient does not have an advance directive, information provided.    Review of Systems   Constitutional: Negative.    HENT: Negative.    Eyes: Negative.    Respiratory: Negative.    Cardiovascular: Negative.    Gastrointestinal: Negative.    Endocrine: Negative.    Genitourinary: Negative.    Musculoskeletal: Negative.    Skin: Negative.    Allergic/Immunologic: Negative.    Neurological: Negative.    Hematological: Negative.    Psychiatric/Behavioral: Negative.         Objective    Vitals:    05/05/22 0955   BP: 126/72   Pulse: 93   Resp: 18   SpO2: 96%   Weight: 62.4 kg (137 lb 9.6 oz)   Height: 157.5 cm (62\")     BMI Readings from Last 1 Encounters:   05/05/22 25.17 kg/m²   BMI is above normal parameters. Recommendations include: exercise counseling and nutrition counseling    Does the patient have evidence of cognitive impairment? No    Physical Exam     General: Alert and oriented x 3.  No acute distress.  Normal affect.  HEENT: Pupils equal, round, reactive to light; extraocular movements intact; sclerae nonicteric; pharynx, ear canals and TMs normal.  Neck: Without JVD, thyromegaly, bruit, or adenopathy.  Lungs: Clear to auscultation in all " fields.  Heart: Regular rate and rhythm without murmur, rub, gallop, or click.  Abdomen: Soft, nontender, without hepatosplenomegaly or hernia.  Bowel sounds normal.  : Deferred.  Rectal: Deferred.  Extremities: Without clubbing, cyanosis, edema, or pulse deficit.  Neurologic: Intact without focal deficit.  Normal station and gait observed during ingress and egress from the examination room.  Skin: Without significant lesion.  Musculoskeletal: Unremarkable.    Lab Results   Component Value Date    CHLPL 138 2022    TRIG 116 2022            HEALTH RISK ASSESSMENT    Smoking Status:  Social History     Tobacco Use   Smoking Status Former Smoker   • Packs/day: 1.50   • Years: 13.00   • Pack years: 19.50   • Start date: 1972   • Quit date: 1985   • Years since quittin.3   Smokeless Tobacco Never Used     Alcohol Consumption:  Social History     Substance and Sexual Activity   Alcohol Use Yes     Fall Risk Screen:    PREETADI Fall Risk Assessment was completed, and patient is at LOW risk for falls.Assessment completed on:2022    Depression Screening:  PHQ-2/PHQ-9 Depression Screening 2022   Little Interest or Pleasure in Doing Things 0-->not at all   Feeling Down, Depressed or Hopeless 0-->not at all   PHQ-9: Brief Depression Severity Measure Score 0       Health Habits and Functional and Cognitive Screening:  Functional & Cognitive Status 2022   Do you have difficulty bathing yourself, getting dressed or grooming yourself? No   Do you have difficulty using the toilet? No   Do you have difficulty moving around from place to place? No   Do you have trouble with steps or getting out of a bed or a chair? No   Current Diet Well Balanced Diet   Dental Exam Not up to date   Eye Exam Not up to date   Exercise (times per week) 0 times per week   Current Exercises Include No Regular Exercise   Do you need help using the phone?  No   Are you deaf or do you have serious difficulty hearing?  No    Do you need help with transportation? No   Do you need help shopping? No   Do you need help preparing meals?  No   Do you need help with housework?  No   Do you need help with laundry? No   Do you need help taking your medications? No   Do you need help managing money? No   Do you ever drive or ride in a car without wearing a seat belt? No       Age-appropriate Screening Schedule:  Refer to the list below for future screening recommendations based on patient's age, sex and/or medical conditions. Orders for these recommended tests are listed in the plan section. The patient has been provided with a written plan.    Health Maintenance   Topic Date Due   • INFLUENZA VACCINE  08/01/2022   • DXA SCAN  10/01/2022   • MAMMOGRAM  02/04/2023   • LIPID PANEL  04/28/2023   • ZOSTER VACCINE  Completed   • TDAP/TD VACCINES  Discontinued              Assessment & Plan   CMS Preventative Services Quick Reference  Risk Factors Identified During Encounter  Cardiovascular Disease  Obesity/Overweight   The above risks/problems have been discussed with the patient.  Follow up actions/plans if indicated are seen below in the Assessment/Plan Section.  Pertinent information has been shared with the patient in the After Visit Summary.    Diagnoses and all orders for this visit:    1. Encounter for subsequent annual wellness visit (AWV) in Medicare patient (Primary)    2. Hyperlipidemia  -     CK; Future  -     Comprehensive Metabolic Panel; Future  -     NMR LipoProfile; Future  -     TSH; Future  -     T4, Free; Future  -     T3, Free; Future  -     rosuvastatin (CRESTOR) 5 MG tablet; Take 1 p.o. daily for high cholesterol  Dispense: 90 tablet; Refill: 3    3. Benign essential hypertension  -     hydroCHLOROthiazide (HYDRODIURIL) 25 MG tablet; Take 1 p.o. every morning for high blood pressure  Dispense: 90 tablet; Refill: 3    4. Vitamin D deficiency  -     Vitamin D 25 Hydroxy; Future    5. Vitamin B12 deficiency  -     CBC (No Diff);  Future  -     Methylmalonic Acid, Serum; Future    6. Multiple environmental allergies    7. History of colon polyps  -     Ambulatory Referral For Screening Colonoscopy    8. Microscopic hematuria  -     US Renal Bilateral  -     Urinalysis With Microscopic If Indicated (No Culture) - Urine, Clean Catch; Future    9. History of kidney stones    10. Chronic migraine w/o aura w/o status migrainosus, not intractable    11. Primary osteoarthritis of both hands  -     HYDROcodone-acetaminophen (NORCO) 5-325 MG per tablet; Take 1 p.o. 3 times daily as needed arthritic pain.  Dispense: 90 tablet; Refill: 0    12. PVD (posterior vitreous detachment), bilateral    13. Nuclear sclerotic cataract of both eyes    14. Therapeutic drug monitoring    15. Colon cancer screening  -     Ambulatory Referral For Screening Colonoscopy        Follow Up:   Return in about 1 year (around 5/5/2023) for Next scheduled follow up with lab prior.     An After Visit Summary and PPPS were made available to the patient.

## 2022-05-13 ENCOUNTER — HOSPITAL ENCOUNTER (OUTPATIENT)
Dept: ULTRASOUND IMAGING | Facility: HOSPITAL | Age: 67
Discharge: HOME | End: 2022-05-13
Admitting: INTERNAL MEDICINE

## 2022-05-13 PROCEDURE — 76775 US EXAM ABDO BACK WALL LIM: CPT

## 2022-05-25 ENCOUNTER — PREP FOR SURGERY (OUTPATIENT)
Dept: OTHER | Facility: HOSPITAL | Age: 67
End: 2022-05-25

## 2022-05-25 DIAGNOSIS — Z86.010 HISTORY OF ADENOMATOUS POLYP OF COLON: Primary | ICD-10-CM

## 2022-05-31 DIAGNOSIS — M19.041 PRIMARY OSTEOARTHRITIS OF BOTH HANDS: Chronic | ICD-10-CM

## 2022-05-31 DIAGNOSIS — M19.042 PRIMARY OSTEOARTHRITIS OF BOTH HANDS: Chronic | ICD-10-CM

## 2022-06-01 DIAGNOSIS — M19.042 PRIMARY OSTEOARTHRITIS OF BOTH HANDS: Chronic | ICD-10-CM

## 2022-06-01 DIAGNOSIS — M19.041 PRIMARY OSTEOARTHRITIS OF BOTH HANDS: Chronic | ICD-10-CM

## 2022-06-01 RX ORDER — HYDROCODONE BITARTRATE AND ACETAMINOPHEN 5; 325 MG/1; MG/1
TABLET ORAL
Qty: 90 TABLET | Refills: 0
Start: 2022-06-01 | End: 2022-06-01 | Stop reason: SDUPTHER

## 2022-06-01 RX ORDER — HYDROCODONE BITARTRATE AND ACETAMINOPHEN 5; 325 MG/1; MG/1
TABLET ORAL
Qty: 90 TABLET | Refills: 0 | Status: SHIPPED | OUTPATIENT
Start: 2022-06-01 | End: 2022-08-08 | Stop reason: SDUPTHER

## 2022-06-06 RX ORDER — RIZATRIPTAN BENZOATE 10 MG/1
TABLET, ORALLY DISINTEGRATING ORAL
Qty: 9 TABLET | Refills: 0 | Status: SHIPPED | OUTPATIENT
Start: 2022-06-06

## 2022-06-09 DIAGNOSIS — J01.00 ACUTE MAXILLARY SINUSITIS, RECURRENCE NOT SPECIFIED: Primary | ICD-10-CM

## 2022-06-09 RX ORDER — AZITHROMYCIN 250 MG/1
TABLET, FILM COATED ORAL
Qty: 6 TABLET | Refills: 0 | Status: SHIPPED | OUTPATIENT
Start: 2022-06-09 | End: 2022-06-30

## 2022-06-10 ENCOUNTER — PREP FOR SURGERY (OUTPATIENT)
Dept: OTHER | Facility: HOSPITAL | Age: 67
End: 2022-06-10

## 2022-06-10 DIAGNOSIS — Z86.010 HISTORY OF ADENOMATOUS POLYP OF COLON: Primary | ICD-10-CM

## 2022-07-01 ENCOUNTER — HOSPITAL ENCOUNTER (OUTPATIENT)
Facility: HOSPITAL | Age: 67
Setting detail: HOSPITAL OUTPATIENT SURGERY
Discharge: HOME | End: 2022-07-01
Attending: SURGERY | Admitting: SURGERY

## 2022-07-01 ENCOUNTER — ANESTHESIA (OUTPATIENT)
Dept: GASTROENTEROLOGY | Facility: HOSPITAL | Age: 67
End: 2022-07-01

## 2022-07-01 ENCOUNTER — ANESTHESIA EVENT (OUTPATIENT)
Dept: GASTROENTEROLOGY | Facility: HOSPITAL | Age: 67
End: 2022-07-01

## 2022-07-01 VITALS
DIASTOLIC BLOOD PRESSURE: 81 MMHG | BODY MASS INDEX: 27.56 KG/M2 | WEIGHT: 146 LBS | SYSTOLIC BLOOD PRESSURE: 129 MMHG | OXYGEN SATURATION: 95 % | HEIGHT: 61 IN | RESPIRATION RATE: 16 BRPM | HEART RATE: 84 BPM

## 2022-07-01 DIAGNOSIS — Z86.010 HISTORY OF ADENOMATOUS POLYP OF COLON: ICD-10-CM

## 2022-07-01 PROCEDURE — 88305 TISSUE EXAM BY PATHOLOGIST: CPT | Performed by: SURGERY

## 2022-07-01 PROCEDURE — S0260 H&P FOR SURGERY: HCPCS | Performed by: SURGERY

## 2022-07-01 PROCEDURE — 25010000002 PROPOFOL 10 MG/ML EMULSION: Performed by: ANESTHESIOLOGY

## 2022-07-01 PROCEDURE — 45380 COLONOSCOPY AND BIOPSY: CPT | Performed by: SURGERY

## 2022-07-01 RX ORDER — SODIUM CHLORIDE, SODIUM LACTATE, POTASSIUM CHLORIDE, CALCIUM CHLORIDE 600; 310; 30; 20 MG/100ML; MG/100ML; MG/100ML; MG/100ML
30 INJECTION, SOLUTION INTRAVENOUS CONTINUOUS PRN
Status: DISCONTINUED | OUTPATIENT
Start: 2022-07-01 | End: 2022-07-01 | Stop reason: HOSPADM

## 2022-07-01 RX ORDER — LIDOCAINE HYDROCHLORIDE 20 MG/ML
INJECTION, SOLUTION INFILTRATION; PERINEURAL AS NEEDED
Status: DISCONTINUED | OUTPATIENT
Start: 2022-07-01 | End: 2022-07-01 | Stop reason: SURG

## 2022-07-01 RX ORDER — PROPOFOL 10 MG/ML
VIAL (ML) INTRAVENOUS AS NEEDED
Status: DISCONTINUED | OUTPATIENT
Start: 2022-07-01 | End: 2022-07-01 | Stop reason: SURG

## 2022-07-01 RX ORDER — PROPOFOL 10 MG/ML
VIAL (ML) INTRAVENOUS CONTINUOUS PRN
Status: DISCONTINUED | OUTPATIENT
Start: 2022-07-01 | End: 2022-07-01 | Stop reason: SURG

## 2022-07-01 RX ADMIN — PROPOFOL 120 MG: 10 INJECTION, EMULSION INTRAVENOUS at 10:37

## 2022-07-01 RX ADMIN — Medication 200 MCG/KG/MIN: at 10:38

## 2022-07-01 RX ADMIN — LIDOCAINE HYDROCHLORIDE 100 MG: 20 INJECTION, SOLUTION INFILTRATION; PERINEURAL at 10:36

## 2022-07-01 RX ADMIN — SODIUM CHLORIDE, POTASSIUM CHLORIDE, SODIUM LACTATE AND CALCIUM CHLORIDE 30 ML/HR: 600; 310; 30; 20 INJECTION, SOLUTION INTRAVENOUS at 10:01

## 2022-07-01 NOTE — H&P
[facility]   HISTORY AND PHYSICAL    Patient Name: Connie Posey  : 1955  MRN: [id number]  Primary Care Physician:  Lenny Guadarrama MD  Date of admission: 2022    Subjective   Subjective     Chief Complaint: History of colon polyps    History of Present Illness     The patient is a pleasant 66-year-old female who has a history of colon polyps.  She is a colonoscopy every 5 years and has had polyps with each procedure.  Her last colonoscopy was in 2018.  She has not had any significant GI symptoms.    Review of Systems   Constitutional: Negative for fatigue and fever.   Respiratory: Negative for chest tightness and shortness of breath.    Cardiovascular: Negative for chest pain and palpitations.   Gastrointestinal: Negative for abdominal pain, blood in stool, constipation, diarrhea, nausea and vomiting.        Personal History     Past Medical History:   Diagnosis Date   • Allergic rhinitis 2010   • Benign essential hypertension 2010    Formatting of this note might be different from the original. Hypertension Formatting of this note might be different from the original. Hypertension   • Chronic midline low back pain without sciatica 2013   • Chronic migraine w/o aura w/o status migrainosus, not intractable 2011   • Dermatochalasis of both eyelids 2018   • History of colon polyps 2022   • History of kidney stones 2022   • Hyperlipidemia 2010   • Microscopic hematuria 2011   • Nuclear sclerotic cataract of both eyes 2019   • Primary osteoarthritis of both hands 2010    Formatting of this note might be different from the original. Osteoarthritis Of The Hand - Bilateral  10/1 IMO update Formatting of this note might be different from the original. Osteoarthritis Of The Hand - Bilateral  10/1 IMO update   • PVD (posterior vitreous detachment), bilateral 2018   • Seborrheic dermatitis 2010    Formatting of this note might be  different from the original. Seborrheic Dermatitis  10/1 IMO update Formatting of this note might be different from the original. Seborrheic Dermatitis  10/1 IMO update   • Vitamin B12 deficiency 04/06/2022   • Vitamin D deficiency 04/06/2022       Past Surgical History:   Procedure Laterality Date   • CHOLECYSTECTOMY N/A    • COLONOSCOPY W/ BIOPSIES N/A 03/17/2017 March 17, 2017--colonoscopy   • VAGINAL HYSTERECTOMY N/A     Age 30--partial vaginal hysterectomy.  Ovaries intact.       Family History: family history includes COPD in her mother; Diabetes in her maternal grandmother; Heart disease in her father and mother; Hypertension in her mother. Otherwise pertinent FHx was reviewed and not pertinent to current issue.    Social History:  reports that she quit smoking about 37 years ago. She started smoking about 50 years ago. She has a 19.50 pack-year smoking history. She has never used smokeless tobacco. She reports current alcohol use. She reports that she does not use drugs.    Home Medications:  Black Pepper-Turmeric, Cyanocobalamin, Eye Vitamins, HYDROcodone-acetaminophen, Probiotic Product, hydroCHLOROthiazide, magnesium, rizatriptan MLT, and rosuvastatin    Allergies:  Allergies   Allergen Reactions   • Sulfa Antibiotics Unknown - High Severity      Confusion--makes her feel weird   • Codeine Confusion     Makes her feel weird and dizzy       • Nitrofurantoin Nausea Only     GI upset   • Propoxyphene Rash      Confusion--makes her feel weird   • Tramadol Palpitations     her twin has this reaction to tramadol. She has not had a reaction          Objective    Objective     Vitals:   Heart Rate:  [93] 93  Resp:  [14] 14  BP: (141)/(86) 141/86    Physical Exam  Constitutional:       Appearance: Normal appearance. She is well-developed. She is not toxic-appearing.   Eyes:      General: No scleral icterus.  Pulmonary:      Effort: Pulmonary effort is normal. No respiratory distress.   Abdominal:       Palpations: Abdomen is soft.      Tenderness: There is no abdominal tenderness.   Skin:     General: Skin is warm and dry.   Neurological:      Mental Status: She is alert and oriented to person, place, and time.   Psychiatric:         Behavior: Behavior normal.         Thought Content: Thought content normal.         Judgment: Judgment normal.           Assessment & Plan   Assessment / Plan     Brief Patient Summary:  Connie Posey is a 66 y.o. female who has a history of colon polyps.    Active Hospital Problems:  Active Hospital Problems    Diagnosis    • **History of colon polyps      Plan: Colonoscopy.  The patient understands the indications, alternatives, risks, and benefits of the procedure and wishes to proceed.      Farhat Schneider Jr., MD

## 2022-07-01 NOTE — ANESTHESIA PREPROCEDURE EVALUATION
Anesthesia Evaluation     Patient summary reviewed and Nursing notes reviewed   NPO Solid Status: > 8 hours  NPO Liquid Status: > 4 hours           Airway   Mallampati: II  Neck ROM: full  No difficulty expected  Dental - normal exam     Pulmonary     breath sounds clear to auscultation  Cardiovascular     Rhythm: regular    (+) hypertension, hyperlipidemia,       Neuro/Psych  (+) headaches,    GI/Hepatic/Renal/Endo      Musculoskeletal     Abdominal    Substance History      OB/GYN          Other   arthritis,                      Anesthesia Plan    ASA 2     MAC     intravenous induction     Anesthetic plan, risks, benefits, and alternatives have been provided, discussed and informed consent has been obtained with: patient.        CODE STATUS:

## 2022-07-01 NOTE — ANESTHESIA POSTPROCEDURE EVALUATION
"Patient: Connie Posey    Procedure Summary     Date: 07/01/22 Room / Location: [facility] /  [facility]    Anesthesia Start: 1034 Anesthesia Stop: 1101    Procedure: COLONOSCOPY INTO CECUM WITH COLD BX POLYPECTOMY (N/A ) Diagnosis:       History of adenomatous polyp of colon      (History of adenomatous polyp of colon [Z86.010])    Surgeons: Farhat Schneider Jr., MD Provider: Rashid Zepeda MD    Anesthesia Type: MAC ASA Status: 2          Anesthesia Type: MAC    Vitals  Vitals Value Taken Time   /81 07/01/22 1123   Temp     Pulse 84 07/01/22 1123   Resp 16 07/01/22 1123   SpO2 95 % 07/01/22 1123           Post Anesthesia Care and Evaluation    Patient location during evaluation: bedside  Patient participation: complete - patient participated  Level of consciousness: sleepy but conscious  Pain score: 0  Pain management: adequate    Airway patency: patent  Anesthetic complications: No anesthetic complications    Cardiovascular status: acceptable  Respiratory status: acceptable  Hydration status: acceptable    Comments: /81 (BP Location: Left arm, Patient Position: Sitting)   Pulse 84   Resp 16   Ht 154.9 cm (61\")   Wt 66.2 kg (146 lb)   SpO2 95%   BMI 27.59 kg/m²         "

## 2022-07-01 NOTE — OP NOTE
Surgeon:     Farhat Schneider Jr., M.D.    Preoperative Diagnosis:     History of colon polyps    Postoperative Diagnosis:     Sigmoid colon polyp    Procedure Performed:     Colonoscopy with biopsy of sigmoid colon polyp    Indications:     The patient is a pleasant 66-year-old female with a history of colon polyps.  She presents for screening colonoscopy in high risk group.  The patient understands the indications, alternatives, risks, and benefits of the procedure and wishes to proceed.    Procedure:     The patient was identified, taken to the endoscopy suite, and place in the left side down decubitus procedure.  The patient underwent a MAC anesthesia and was appropriately monitored through the case by the anesthesia personnel.  A rectal exam was performed that was normal.  The colonoscope was introduced into the rectum and advanced very carefully to the cecum that was identified by the cecal strap, ileocecal valve, and the appendiceal orifice.  The scope was then slowly withdrawn with careful circumferential examination of the mucosa performed.  The bowel prep was good allowing adequate visualization of mucosal surfaces.  The scope was withdrawn.  The patient tolerated the procedure well and was transferred the recovery area in stable condition.    Findings:    There was a 4 mm polyp in the sigmoid colon that was removed by cold biopsy forceps and retrieved.  The remainder of the colon was normal.    Recommendations:     1.  Await pathology results of polypectomy.  2.  Repeat colonoscopy in 5 years    Farhat Schneider Jr., M.D.

## 2022-07-05 LAB
LAB AP CASE REPORT: NORMAL
PATH REPORT.FINAL DX SPEC: NORMAL
PATH REPORT.GROSS SPEC: NORMAL

## 2022-07-07 ENCOUNTER — TELEPHONE (OUTPATIENT)
Dept: SURGERY | Facility: CLINIC | Age: 67
End: 2022-07-07

## 2022-07-07 NOTE — TELEPHONE ENCOUNTER
Patient called this afternoon asking when she is due for her repeat colonoscopy.  I advised the patient per her colonoscopy note from 7/1/22,  It is recommended she repeat her colonoscopy in 5 years.  Patient voiced understanding.  Patient expressed concern over pain starting in her left shoulder, and moving toward the center of her chest above her breast.  Patient states the pain has been improving everyday since her colonoscopy.  Advised patient she was on her left side for the procedure and the pain is a result of positioning, and it is a good thing the pain is less everyday.  Patient voiced understanding.

## 2022-07-18 ENCOUNTER — TELEPHONE (OUTPATIENT)
Dept: SURGERY | Facility: CLINIC | Age: 67
End: 2022-07-18

## 2022-07-18 NOTE — TELEPHONE ENCOUNTER
----- Message from Farhat Schneider Jr., MD sent at 7/14/2022  5:18 PM EDT -----  Please contact this patient to inform that the polyp is benign and a repeat colonoscopy is needed in 5 years.  Please place in recall for a colonoscopy in 5 years.  Thanks.

## 2022-07-29 DIAGNOSIS — H02.831 DERMATOCHALASIS OF BOTH UPPER EYELIDS: Primary | Chronic | ICD-10-CM

## 2022-07-29 DIAGNOSIS — H02.834 DERMATOCHALASIS OF BOTH UPPER EYELIDS: Primary | Chronic | ICD-10-CM

## 2022-08-08 DIAGNOSIS — M19.042 PRIMARY OSTEOARTHRITIS OF BOTH HANDS: Chronic | ICD-10-CM

## 2022-08-08 DIAGNOSIS — M19.041 PRIMARY OSTEOARTHRITIS OF BOTH HANDS: Chronic | ICD-10-CM

## 2022-08-09 RX ORDER — HYDROCODONE BITARTRATE AND ACETAMINOPHEN 5; 325 MG/1; MG/1
TABLET ORAL
Qty: 90 TABLET | Refills: 0 | Status: SHIPPED | OUTPATIENT
Start: 2022-08-09 | End: 2022-10-19 | Stop reason: SDUPTHER

## 2022-10-04 ENCOUNTER — HOSPITAL ENCOUNTER (OUTPATIENT)
Dept: GENERAL RADIOLOGY | Facility: HOSPITAL | Age: 67
Discharge: HOME | End: 2022-10-04
Admitting: INTERNAL MEDICINE

## 2022-10-04 ENCOUNTER — OFFICE VISIT (OUTPATIENT)
Dept: INTERNAL MEDICINE | Facility: CLINIC | Age: 67
End: 2022-10-04

## 2022-10-04 VITALS
RESPIRATION RATE: 18 BRPM | WEIGHT: 138.8 LBS | DIASTOLIC BLOOD PRESSURE: 74 MMHG | SYSTOLIC BLOOD PRESSURE: 132 MMHG | BODY MASS INDEX: 26.21 KG/M2 | HEIGHT: 61 IN | HEART RATE: 82 BPM | OXYGEN SATURATION: 96 %

## 2022-10-04 DIAGNOSIS — M54.16 LEFT LUMBAR RADICULOPATHY: Primary | ICD-10-CM

## 2022-10-04 DIAGNOSIS — M54.42 CHRONIC MIDLINE LOW BACK PAIN WITH LEFT-SIDED SCIATICA: ICD-10-CM

## 2022-10-04 DIAGNOSIS — M85.89 OSTEOPENIA OF MULTIPLE SITES: ICD-10-CM

## 2022-10-04 DIAGNOSIS — G89.29 CHRONIC MIDLINE LOW BACK PAIN WITH LEFT-SIDED SCIATICA: ICD-10-CM

## 2022-10-04 DIAGNOSIS — Z78.0 POSTMENOPAUSAL STATE: Chronic | ICD-10-CM

## 2022-10-04 PROBLEM — M25.562 CHRONIC PAIN OF LEFT KNEE: Status: ACTIVE | Noted: 2022-10-04

## 2022-10-04 PROCEDURE — 72110 X-RAY EXAM L-2 SPINE 4/>VWS: CPT

## 2022-10-04 PROCEDURE — 99214 OFFICE O/P EST MOD 30 MIN: CPT | Performed by: INTERNAL MEDICINE

## 2022-10-04 RX ORDER — PREDNISONE 10 MG/1
TABLET ORAL
Qty: 46 TABLET | Refills: 0 | Status: SHIPPED | OUTPATIENT
Start: 2022-10-04

## 2022-10-04 NOTE — PROGRESS NOTES
10/04/2022    Patient Information  Connie Posey                                                                                          [address] [place] [address]      1955  [unfilled]  There is no work phone number on file.    Chief Complaint:     Back and left leg pain.    History of Present Illness:    October 4, 2022--patient presents with approximately 2-month history of intermittent episodes of pain in her left lower extremity that oftentimes begins at the buttock and shoots down the leg posterior laterally to the level of the mid and sometimes distal calf.  This is worse with certain movements as well as prolonged standing.  If she sits down and drives it tends to get worse.  She occasionally gets some tingling in the leg but no numbness.  She does have low back pain that is present most of the days.  On exam straight leg raising on the left is possibly positive.  Back exam not particularly revealing.  Plan is as follows: X-ray lumbar spine today.  I will contact patient tomorrow with the results and possible further instructions.  We discussed various treatment options including prednisone which does not appeal very much to her.  We also discussed nonsteroidal anti-inflammatory medications which patient is concerned about her blood pressure and other issues.  After discussion she is agreeable to try a tapering dose of prednisone.  She may need orthopedic referral.  Prednisone 50 mg p.o. daily x5 days, taper and discontinue.  Patient will contact me if she continues to have problems.    Review of Systems   Constitutional: Negative.   HENT: Negative.    Eyes: Negative.    Cardiovascular: Negative.    Respiratory: Negative.    Endocrine: Negative.    Hematologic/Lymphatic: Negative.    Skin: Negative.    Musculoskeletal: Positive for back pain.   Gastrointestinal: Negative.    Genitourinary: Negative.    Neurological: Positive for paresthesias. Negative for numbness.    Psychiatric/Behavioral: Negative.    Allergic/Immunologic: Negative.        Active Problems:    Patient Active Problem List   Diagnosis   • Allergic rhinitis   • Chronic midline low back pain with left-sided sciatica   • Dermatochalasis of both eyelids   • Benign essential hypertension   • Microscopic hematuria   • Chronic migraine w/o aura w/o status migrainosus, not intractable   • Hyperlipidemia   • Primary osteoarthritis of both hands   • Seborrheic dermatitis   • PVD (posterior vitreous detachment), bilateral   • Nuclear sclerotic cataract of both eyes   • Multiple environmental allergies   • Vitamin D deficiency   • Therapeutic drug monitoring   • Vitamin B12 deficiency   • History of colon polyps, 7/1/2022--hyperplastic x1.   • History of kidney stones   • Left lumbar radiculopathy   • Osteopenia of multiple sites   • Postmenopausal state         Past Medical History:   Diagnosis Date   • Allergic rhinitis 12/17/2010   • Benign essential hypertension 12/17/2010    Formatting of this note might be different from the original. Hypertension Formatting of this note might be different from the original. Hypertension   • Chronic midline low back pain without sciatica 11/21/2013   • Chronic migraine w/o aura w/o status migrainosus, not intractable 09/16/2011   • Dermatochalasis of both eyelids 07/17/2018   • History of colon polyps, 7/1/2022--hyperplastic x1. 04/06/2022 July 1, 2022--colonoscopy revealed hyperplastic polyp in the sigmoid colon x1.   • History of kidney stones 05/05/2022   • Hyperlipidemia 12/23/2010   • Microscopic hematuria 02/11/2011   • Nuclear sclerotic cataract of both eyes 03/06/2019   • Osteopenia of multiple sites 10/4/2022   • Postmenopausal state 10/4/2022   • Primary osteoarthritis of both hands 08/13/2010    Formatting of this note might be different from the original. Osteoarthritis Of The Hand - Bilateral  10/1 IMO update Formatting of this note might be different from the  original. Osteoarthritis Of The Hand - Bilateral  10/1 IMO update   • PVD (posterior vitreous detachment), bilateral 07/17/2018   • Seborrheic dermatitis 12/17/2010    Formatting of this note might be different from the original. Seborrheic Dermatitis  10/1 IMO update Formatting of this note might be different from the original. Seborrheic Dermatitis  10/1 IMO update   • Vitamin B12 deficiency 04/06/2022   • Vitamin D deficiency 04/06/2022         Past Surgical History:   Procedure Laterality Date   • CHOLECYSTECTOMY N/A    • COLONOSCOPY N/A 07/01/2022 July 1, 2022--colonoscopy revealed hyperplastic polyp in the sigmoid colon x1.   • COLONOSCOPY W/ BIOPSIES N/A 03/17/2017 March 17, 2017--colonoscopy   • VAGINAL HYSTERECTOMY N/A     Age 30--partial vaginal hysterectomy.  Ovaries intact.         Allergies   Allergen Reactions   • Sulfa Antibiotics Unknown - High Severity      Confusion--makes her feel weird   • Codeine Confusion     Makes her feel weird and dizzy       • Nitrofurantoin Nausea Only     GI upset   • Propoxyphene Rash      Confusion--makes her feel weird   • Tramadol Palpitations     her twin has this reaction to tramadol. She has not had a reaction              Current Outpatient Medications:   •  Black Pepper-Turmeric 3-500 MG capsule, Take  by mouth., Disp: , Rfl:   •  Cyanocobalamin 1000 MCG sublingual tablet, Place  under the tongue., Disp: , Rfl:   •  hydroCHLOROthiazide (HYDRODIURIL) 25 MG tablet, Take 1 p.o. every morning for high blood pressure, Disp: 90 tablet, Rfl: 3  •  HYDROcodone-acetaminophen (NORCO) 5-325 MG per tablet, Take 1 p.o. 3 times daily as needed arthritic pain., Disp: 90 tablet, Rfl: 0  •  magnesium 30 MG tablet, Take 30 mg by mouth., Disp: , Rfl:   •  Multiple Vitamins-Minerals (Eye Vitamins) capsule, Take  by mouth., Disp: , Rfl:   •  PROBIOTIC PRODUCT PO, Take  by mouth., Disp: , Rfl:   •  rizatriptan MLT (MAXALT-MLT) 10 MG disintegrating tablet, May repeat in 2 hours  "if needed, Disp: 9 tablet, Rfl: 0  •  rosuvastatin (CRESTOR) 5 MG tablet, Take 1 p.o. daily for high cholesterol, Disp: 90 tablet, Rfl: 3  •  predniSONE (DELTASONE) 10 MG tablet, 5 by mouth daily 5 days, then 4 daily 2 days, 3 daily 2 days, 2 daily 2 days, 1 daily 2 days, one half daily 2 days and then discontinue., Disp: 46 tablet, Rfl: 0      Family History   Problem Relation Age of Onset   • Hypertension Mother    • COPD Mother    • Heart disease Mother    • Heart disease Father    • Diabetes Maternal Grandmother    • Malig Hyperthermia Neg Hx          Social History     Socioeconomic History   • Marital status: Single   Tobacco Use   • Smoking status: Former Smoker     Packs/day: 1.50     Years: 13.00     Pack years: 19.50     Start date: 1972     Quit date: 1985     Years since quittin.7   • Smokeless tobacco: Never Used   Vaping Use   • Vaping Use: Never used   Substance and Sexual Activity   • Alcohol use: Yes   • Drug use: Never   • Sexual activity: Defer         Vitals:    10/04/22 1046   BP: 132/74   Pulse: 82   Resp: 18   SpO2: 96%   Weight: 63 kg (138 lb 12.8 oz)   Height: 154.9 cm (61\")        Body mass index is 26.23 kg/m².      Physical Exam:    General: Alert and oriented x 3.  No acute distress.  Normal affect.  HEENT: Pupils equal, round, reactive to light; extraocular movements intact; sclerae nonicteric; pharynx, ear canals and TMs normal.  Neck: Without JVD, thyromegaly, bruit, or adenopathy.  Lungs: Clear to auscultation in all fields.  Heart: Regular rate and rhythm without murmur, rub, gallop, or click.  Abdomen: Soft, nontender, without hepatosplenomegaly or hernia.  Bowel sounds normal.  : Deferred.  Rectal: Deferred.  Extremities: Without clubbing, cyanosis, edema, or pulse deficit.  Neurologic: Intact without focal deficit.  Normal station and gait observed during ingress and egress from the examination room.  Skin: Without significant lesion.  Musculoskeletal: Straight leg " raising on the left is likely positive.  Back is not particularly revealing.    Lab/other results:      Assessment/Plan:     Diagnosis Plan   1. Left lumbar radiculopathy  XR Spine Lumbar Complete 4+VW    predniSONE (DELTASONE) 10 MG tablet   2. Chronic midline low back pain with left-sided sciatica  XR Spine Lumbar Complete 4+VW    predniSONE (DELTASONE) 10 MG tablet   3. Osteopenia of multiple sites  DEXA Bone Density Axial   4. Postmenopausal state  DEXA Bone Density Axial     October 4, 2022--patient presents with approximately 2-month history of intermittent episodes of pain in her left lower extremity that oftentimes begins at the buttock and shoots down the leg posterior laterally to the level of the mid and sometimes distal calf.  This is worse with certain movements as well as prolonged standing.  If she sits down and drives it tends to get worse.  She occasionally gets some tingling in the leg but no numbness.  She does have low back pain that is present most of the days.  On exam straight leg raising on the left is possibly positive.  Back exam not particularly revealing.  Plan is as follows: X-ray lumbar spine today.  I will contact patient tomorrow with the results and possible further instructions.  We discussed various treatment options including prednisone which does not appeal very much to her.  We also discussed nonsteroidal anti-inflammatory medications which patient is concerned about her blood pressure and other issues.  After discussion she is agreeable to try a tapering dose of prednisone.  She may need orthopedic referral.  Prednisone 50 mg p.o. daily x5 days, taper and discontinue.  Patient will contact me if she continues to have problems.    Addendum: Patient with osteopenia and is due for her DEXA scan.  Ordered.      Procedures        Answers for HPI/ROS submitted by the patient on 10/3/2022  Please describe your symptoms.: Left leg pain  Have you had these symptoms before?: Yes  How  long have you been having these symptoms?: Greater than 2 weeks  What is the primary reason for your visit?: Other

## 2022-10-11 ENCOUNTER — TELEPHONE (OUTPATIENT)
Dept: INTERNAL MEDICINE | Facility: CLINIC | Age: 67
End: 2022-10-11

## 2022-10-11 NOTE — TELEPHONE ENCOUNTER
Caller: Connie Posey    Relationship: Self    Best call back number: [phone]     Who are you requesting to speak with (clinical staff, provider,  specific staff member): CLINICAL    What was the call regarding: PATIENT STATES SHE HAS NOT HEARD BACK FROM THE OFFICE REGARDING THE RESULTS RELATED TO HER VISIT ON 10/4.  SHE STATES SHE CAN SEE THE RESULTS ARE POSTED, BUT SHE NEEDS A FURTHER EXPLANATION.  PLEASE ADVISE.      Do you require a callback: YES

## 2022-10-14 NOTE — TELEPHONE ENCOUNTER
Called pt;she says she say mychart and had more questions. She has not completed the medication but states she will reply to [employer] message with all of her concerns once she completes meds in 3 days.

## 2022-10-19 DIAGNOSIS — M19.042 PRIMARY OSTEOARTHRITIS OF BOTH HANDS: Chronic | ICD-10-CM

## 2022-10-19 DIAGNOSIS — M19.041 PRIMARY OSTEOARTHRITIS OF BOTH HANDS: Chronic | ICD-10-CM

## 2022-10-20 RX ORDER — HYDROCODONE BITARTRATE AND ACETAMINOPHEN 5; 325 MG/1; MG/1
TABLET ORAL
Qty: 90 TABLET | Refills: 0 | Status: SHIPPED | OUTPATIENT
Start: 2022-10-20 | End: 2022-11-30 | Stop reason: SDUPTHER

## 2022-11-30 DIAGNOSIS — M19.041 PRIMARY OSTEOARTHRITIS OF BOTH HANDS: Chronic | ICD-10-CM

## 2022-11-30 DIAGNOSIS — M19.042 PRIMARY OSTEOARTHRITIS OF BOTH HANDS: Chronic | ICD-10-CM

## 2022-12-02 RX ORDER — HYDROCODONE BITARTRATE AND ACETAMINOPHEN 5; 325 MG/1; MG/1
TABLET ORAL
Qty: 90 TABLET | Refills: 0 | Status: SHIPPED | OUTPATIENT
Start: 2022-12-02 | End: 2023-01-18 | Stop reason: SDUPTHER

## 2022-12-21 DIAGNOSIS — H90.3 BILATERAL SENSORINEURAL HEARING LOSS: Primary | ICD-10-CM

## 2023-01-18 DIAGNOSIS — M19.041 PRIMARY OSTEOARTHRITIS OF BOTH HANDS: Chronic | ICD-10-CM

## 2023-01-18 DIAGNOSIS — M19.042 PRIMARY OSTEOARTHRITIS OF BOTH HANDS: Chronic | ICD-10-CM

## 2023-01-18 RX ORDER — HYDROCODONE BITARTRATE AND ACETAMINOPHEN 5; 325 MG/1; MG/1
TABLET ORAL
Qty: 90 TABLET | Refills: 0 | Status: SHIPPED | OUTPATIENT
Start: 2023-01-18 | End: 2023-03-04 | Stop reason: SDUPTHER

## 2023-02-01 DIAGNOSIS — L98.9 SKIN LESION OF FACE: Primary | ICD-10-CM

## 2023-02-03 ENCOUNTER — NURSE TRIAGE (OUTPATIENT)
Dept: CALL CENTER | Facility: HOSPITAL | Age: 68
End: 2023-02-03
Payer: MEDICARE

## 2023-02-03 NOTE — TELEPHONE ENCOUNTER
"[facility] hurt her foot 3-4 days ago and is asking where she should go for evaluation. Reviewed guideline with [facility], advises she be evaluated within 24 hours. Advised to go to UC or ED for evaluation of injury to foot.     Reason for Disposition  • Large swelling or bruise (> 2 inches or 5 cm)    Additional Information  • Negative: Serious injury with multiple fractures  • Negative: [1] Major bleeding (e.g., actively dripping or spurting) AND [2] can't be stopped  • Negative: Amputation  • Negative: Looks like a dislocated joint (very crooked or deformed)  • Negative: Sounds like a life-threatening emergency to the triager  • Negative: Wound looks infected  • Negative: Caused by an animal bite  • Negative: Caused by a human bite  • Negative: Puncture wound of foot  • Negative: Toe injury is main concern  • Negative: Cast problems or questions  • Negative: Bullet wound, stabbed by knife, or other serious penetrating wound  • Negative: Skin is split open or gaping (or length > 1/2 inch or 12 mm)  • Negative: [1] Bleeding AND [2] won't stop after 10 minutes of direct pressure (using correct technique)  • Negative: [1] Dirt in the wound AND [2] not removed with 15 minutes of scrubbing  • Negative: Can't stand (bear weight) or walk  • Negative: [1] Numbness (new loss of sensation) of toe(s) AND [2] present now  • Negative: Sounds like a serious injury to the triager  • Negative: [1] SEVERE pain AND [2] not improved 2 hours after pain medicine/ice packs  • Negative: Suspicious history for the injury  • Negative: [1] Limp when walking AND [2] due to a twisted ankle or foot  • Negative: [1] Limp when walking AND [2] due to a direct blow or crushing injury    Answer Assessment - Initial Assessment Questions  1. MECHANISM: \"How did the injury happen?\" (e.g., twisting injury, direct blow)       Got up in the night and \"rolled her foot from her big toe to her arch\"  2. ONSET: \"When did the injury happen?\" (Minutes or hours ago) " "      3-4 days ago  3. LOCATION: \"Where is the injury located?\"       Left foot, from the big toe around to the upper foot  4. APPEARANCE of INJURY: \"What does the injury look like?\"       Swollen and bruised   5. WEIGHT-BEARING: \"Can you put weight on that foot?\" \"Can you walk (four steps or more)?\"        Can walk on the foot  6. SIZE: For cuts, bruises, or swelling, ask: \"How large is it?\" (e.g., inches or centimeters;  entire joint)       Swelling is starting to go down, Bruise is still there, from the arch of her foot straight across to the third tow  7. PAIN: \"Is there pain?\" If so, ask: \"How bad is the pain?\"    (e.g., Scale 1-10; or mild, moderate, severe)      Yes, bearable  8. TETANUS: For any breaks in the skin, ask: \"When was the last tetanus booster?\"      na  9. OTHER SYMPTOMS: \"Do you have any other symptoms?\"       Tad bit numb  10. PREGNANCY: \"Is there any chance you are pregnant?\" \"When was your last menstrual period?\"        na    Protocols used: FOOT AND ANKLE INJURY-ADULT-      "

## 2023-03-04 DIAGNOSIS — M19.041 PRIMARY OSTEOARTHRITIS OF BOTH HANDS: Chronic | ICD-10-CM

## 2023-03-04 DIAGNOSIS — M19.042 PRIMARY OSTEOARTHRITIS OF BOTH HANDS: Chronic | ICD-10-CM

## 2023-03-09 ENCOUNTER — OFFICE VISIT (OUTPATIENT)
Dept: INTERNAL MEDICINE | Facility: CLINIC | Age: 68
End: 2023-03-09
Payer: MEDICARE

## 2023-03-09 VITALS
TEMPERATURE: 97 F | DIASTOLIC BLOOD PRESSURE: 80 MMHG | HEART RATE: 62 BPM | BODY MASS INDEX: 26.06 KG/M2 | SYSTOLIC BLOOD PRESSURE: 140 MMHG | OXYGEN SATURATION: 96 % | HEIGHT: 61 IN | RESPIRATION RATE: 16 BRPM | WEIGHT: 138 LBS

## 2023-03-09 DIAGNOSIS — R05.1 ACUTE COUGH: Primary | ICD-10-CM

## 2023-03-09 DIAGNOSIS — J06.9 UPPER RESPIRATORY TRACT INFECTION, UNSPECIFIED TYPE: ICD-10-CM

## 2023-03-09 RX ORDER — TRIAMCINOLONE ACETONIDE 1 MG/G
CREAM TOPICAL
COMMUNITY
Start: 2023-01-03

## 2023-03-09 RX ORDER — FEXOFENADINE HCL 180 MG/1
180 TABLET ORAL
COMMUNITY
Start: 2023-01-20

## 2023-03-09 RX ORDER — HYDROCHLOROTHIAZIDE 25 MG/1
25 TABLET ORAL
COMMUNITY
Start: 2023-01-20

## 2023-03-09 RX ORDER — MAGNESIUM GLYCINATE 11.7%
POWDER (GRAM) MISCELLANEOUS
COMMUNITY
Start: 2022-12-30

## 2023-03-09 RX ORDER — HYDROCODONE BITARTRATE AND ACETAMINOPHEN 5; 325 MG/1; MG/1
TABLET ORAL
COMMUNITY
Start: 2023-01-20

## 2023-03-09 RX ORDER — ROSUVASTATIN CALCIUM 5 MG/1
5 TABLET, COATED ORAL
COMMUNITY
Start: 2023-01-20

## 2023-03-09 RX ORDER — AZITHROMYCIN 250 MG/1
TABLET, FILM COATED ORAL
Qty: 6 TABLET | Refills: 0 | Status: SHIPPED | OUTPATIENT
Start: 2023-03-09

## 2023-03-09 NOTE — PROGRESS NOTES
"Chief Complaint  Sore Throat (cough)    Subjective        Connie Posey presents to [facility]  History of Present Illness    Patient is a pleasant 67-year-old female who typically sees Dr. Guadarrama here in the office.  Patient is here with complaints of a sore throat and cough that has occurred since last Saturday.  Denies any shortness of breath or high fever or chills at this time.  History of bronchitis.  Feels like bronchitis.  Is taking Mucinex at this time.     Objective   Vital Signs:  /80   Pulse 62   Temp 97 °F (36.1 °C)   Resp 16   Ht 154.9 cm (61\")   Wt 62.6 kg (138 lb)   SpO2 96%   BMI 26.07 kg/m²   Estimated body mass index is 26.07 kg/m² as calculated from the following:    Height as of this encounter: 154.9 cm (61\").    Weight as of this encounter: 62.6 kg (138 lb).       BMI is >= 25 and <30. (Overweight) The following options were offered after discussion;: exercise counseling/recommendations and nutrition counseling/recommendations      Physical Exam  Vitals and nursing note reviewed.   Constitutional:       Appearance: Normal appearance.   HENT:      Head: Normocephalic.      Nose: Congestion present.      Comments: complaints of a sore throat and cough that has occurred since last Saturday.      Mouth/Throat:      Mouth: Mucous membranes are moist.      Pharynx: No oropharyngeal exudate or posterior oropharyngeal erythema.   Eyes:      Pupils: Pupils are equal, round, and reactive to light.   Cardiovascular:      Rate and Rhythm: Normal rate and regular rhythm.      Pulses: Normal pulses.      Heart sounds: Normal heart sounds.      Comments: No peripheral edema  Pulmonary:      Effort: Pulmonary effort is normal. No respiratory distress.      Breath sounds: Normal breath sounds. No stridor. No wheezing, rhonchi or rales.      Comments: Denies shortness of breath  Chest:      Chest wall: No tenderness.   Musculoskeletal:         General: Normal range of " motion.   Skin:     General: Skin is warm.      Capillary Refill: Capillary refill takes less than 2 seconds.   Neurological:      Mental Status: She is alert and oriented to person, place, and time.   Psychiatric:         Behavior: Behavior normal.        Result Review :    Common labs    Common Labs 4/28/22 4/28/22 4/28/22    0857 [id number] 0857   Glucose  98    BUN  17    Creatinine  0.93    Sodium  142    Potassium  3.8    Chloride  99    Calcium  9.8    Albumin  4.40    Total Bilirubin  0.9    Alkaline Phosphatase  61    AST (SGOT)  13    ALT (SGPT)  15    WBC 5.08     Hemoglobin 14.9     Hematocrit 46.1     Platelets 223     Total Cholesterol   138   Triglycerides   116                        Assessment and Plan   Diagnoses and all orders for this visit:    1. Acute cough (Primary)    2. Upper respiratory tract infection, unspecified type    Other orders  -     azithromycin (Zithromax Z-Trevon) 250 MG tablet; Take 2 tablets by mouth on day 1, then 1 tablet daily on days 2-5  Dispense: 6 tablet; Refill: 0      Patient will continue Mucinex as needed.  She will stop at the pharmacy and  her prescription for azithromycin and take as directed.  Patient is aware if she develops any shortness of breath or high fever that does not come down with Tylenol or ibuprofen she will go to the emergency room.  Patient verbalizes understanding of treatment plan at this time.     I spent 30 minutes caring for Connie on this date of service. This time includes time spent by me in the following activities:preparing for the visit, reviewing tests, obtaining and/or reviewing a separately obtained history, performing a medically appropriate examination and/or evaluation , counseling and educating the patient/family/caregiver, ordering medications, tests, or procedures, referring and communicating with other health care professionals , documenting information in the medical record, independently interpreting results and communicating  that information with the patient/family/caregiver and care coordination  Follow Up   Return if symptoms worsen or fail to improve.  Patient was given instructions and counseling regarding her condition or for health maintenance advice. Please see specific information pulled into the AVS if appropriate.

## 2023-03-12 RX ORDER — HYDROCODONE BITARTRATE AND ACETAMINOPHEN 5; 325 MG/1; MG/1
TABLET ORAL
Qty: 90 TABLET | Refills: 0 | Status: SHIPPED | OUTPATIENT
Start: 2023-03-12

## 2023-03-29 ENCOUNTER — TELEPHONE (OUTPATIENT)
Dept: INTERNAL MEDICINE | Facility: CLINIC | Age: 68
End: 2023-03-29
Payer: MEDICARE

## 2023-03-29 NOTE — TELEPHONE ENCOUNTER
Mucinex/guaifenesin it is in the blue box over-the-counter patient will take this as needed for any chest congestion she may have.

## 2023-03-29 NOTE — TELEPHONE ENCOUNTER
Caller: Connie Posey    Relationship: Self    Best call back number: [phone]    What is the best time to reach you: ANYTIME    Who are you requesting to speak with (clinical staff, provider,  specific staff member): SATYA MELGOZA    What was the call regarding: PATIENT STATES THAT SHE SAY SATYA MELGOZA ON 3/9/23 AND WAS DIAGNOSED WITH BRONCHITIS. PATIENT STATES THAT SATYA MELGOZA TOLD HER IF SHE FELT SHE WAS GETTING IT AGAIN TO NOT TAKE THE MUCINEX DM AND TO TAKE THE BLUE BOX.PATIENT WAS JUST WANTING TO DOUBLE CHECK AND MAKE SURE WHAT SHE WAS SUPPOSED TO TAKE OR ANY OTHER MEDICATIONS OR ADVICE.

## 2023-03-30 ENCOUNTER — TELEPHONE (OUTPATIENT)
Dept: INTERNAL MEDICINE | Facility: CLINIC | Age: 68
End: 2023-03-30
Payer: COMMERCIAL

## 2023-03-30 NOTE — TELEPHONE ENCOUNTER
Caller: TatyConnie    Relationship: Self    Best call back number: [phone]    What is the best time to reach you: ANYTIME    Who are you requesting to speak with (clinical staff, provider,  specific staff member): CLINCIAL    What was the call regarding: PATIENT STATES THAT SHE HAS BEEN TAKING THE MEDICATION IN THE BLUE BOX( 3 ROUNDS OF IT) BUT IT STILL DOESN'T SEEMS LIKE IT HAS GONE AWAY IT. PATIENT  IS WANTING TO KNOW WHEN/IF SOMEONE CAN DETERMINE THE MEDICATION IS NOT WORKING. PATIENT IS REQUESTING A CALLBACK TO KNOW IF MAYBE ANOTHER MEDICATION SHE CAN TAKE.

## 2023-03-31 NOTE — TELEPHONE ENCOUNTER
I can send in a prescription for a steroid to see if that will help.   Does she have any SOB, fever, chills? Thanks, Yessy

## 2023-03-31 NOTE — TELEPHONE ENCOUNTER
Hub staff attempted to follow warm transfer process and was unsuccessful     Caller: Connie Posey    Relationship to patient: Self    Best call back number: [phone]    Patient is needing: A CALL BACK     What was the call regarding: PATIENT RETURNING CALL AS THE PERSON WHO CALLED COULD NOT HEAR HER.    Do you require a callback: YES

## 2023-04-03 NOTE — TELEPHONE ENCOUNTER
Caller: Connie Posey    Relationship: Self    Best call back number: [phone]    What medication are you requesting: SOMETHING FOR SYMPTOMS    What are your current symptoms: COUGH, YELLOW PHLEGM, RUNNY NOSE, CONGESTION, NIGHT SWEATS (PRIOR), NAUSEATED DUE TO DRAINAGE    How long have you been experiencing symptoms: FOR TWO WEEKS    Have you had these symptoms before:    [x] Yes  [] No    Have you been treated for these symptoms before:   [x] Yes  [] No    If a prescription is needed, what is your preferred pharmacy and phone number: [id number] - [facility] - [address]  AT [place] [address] & [facility] [phone]  - [phone] FX     Additional notes:MUSINEX IS NOT WORKING AND IT IS NOT.  ITS A 12 HOUR PILL AND IT WEARS OFF AROUND 8 HOURS OR LESS.  PATIENT STATED THAT SHE IS ON HER 5TH DAY AND HAS HAD NO CHANGES IN HER COUGHING AND DRAINAGE.    PATIENT STATED THAT SHE HAD CALLED SEVERAL TIMES AND NEEDED OR WANTED TO HEAR SOMETHING PRIOR TO GOING OUT OF TOWN.  PATIENT IS NOW IN [facility] AND WILL NEED THE MEDICATION TRANSFERRED THERE.    PATIENT IS FRUSTRATED THAT SHE DID NOT HEAR BACK ON Friday AFTER LEAVING MANY MESSAGES.      PATIENT STATED THAT IT FEELS LIKE IT IS GOING INTO HER CHEST AND WANTED TO GET IT TAKEN CARE OF BEFORE IT GETS INTO HER CHEST.    PATIENT STATED THAT SHE WAS GIVEN A COUGH MEDICATION THAT SHE FELT LIKE TASTED LIKE BANANAS AND IT CLEARED IT UP WITHIN THREE DAYS.   PATIENT STATED THAT SHE WAS QUITE A BIT YOUNGER WHEN SHE LAST RECEIVED IT.

## 2023-04-03 NOTE — TELEPHONE ENCOUNTER
PATIENT CALLED TO CHECK STATUS OF RECENT REQUEST.     HUB ATTEMPTED TO REACH OUT TO OFFICE (X3) AND WAS UNSUCCESSFUL.     PLEASE CALL AND ADVISE AS SOON AS POSSIBLE.

## 2023-04-06 NOTE — TELEPHONE ENCOUNTER
"Pt stated that she was still having nasal congestion with yellow mucus and cold sweats, at time. Pt wanted to \"get ahead\" of whatever may be coming. Pt is out of state, in [place]. Pt was advised per Yessy Carpenter, that she would need to continue with Mucinex and allergy tab to fight this until she can get back in with her.     Pt stated that she is currently taking Allegra tab with Mucinex and that wasn't working. Wanted to know if there was something else that she could do in the mean time?    Spoke with Yessy Carpenter and gave pt info that she should continue with med and if not better, to visit urgent care. Pt voiced understanding.   "

## 2023-04-21 ENCOUNTER — HOSPITAL ENCOUNTER (OUTPATIENT)
Dept: GENERAL RADIOLOGY | Facility: HOSPITAL | Age: 68
Discharge: HOME | End: 2023-04-21
Payer: MEDICARE

## 2023-04-21 ENCOUNTER — OFFICE VISIT (OUTPATIENT)
Dept: INTERNAL MEDICINE | Facility: CLINIC | Age: 68
End: 2023-04-21
Payer: MEDICARE

## 2023-04-21 VITALS
HEART RATE: 89 BPM | RESPIRATION RATE: 16 BRPM | WEIGHT: 140 LBS | DIASTOLIC BLOOD PRESSURE: 80 MMHG | HEIGHT: 61 IN | TEMPERATURE: 96.5 F | BODY MASS INDEX: 26.43 KG/M2 | SYSTOLIC BLOOD PRESSURE: 134 MMHG | OXYGEN SATURATION: 98 %

## 2023-04-21 DIAGNOSIS — M25.512 ACUTE PAIN OF LEFT SHOULDER: Primary | ICD-10-CM

## 2023-04-21 PROCEDURE — 73030 X-RAY EXAM OF SHOULDER: CPT

## 2023-04-21 NOTE — PROGRESS NOTES
"Chief Complaint  Arm Pain (From fore arm to shoulder, 2 weeks)    Subjective        Connie Posey presents to [facility]  History of Present Illness    Patient is a pleasant 67-year-old female who typically sees Dr. Guadarrama here in the office.  Patient is new to me and presents the clinic today with complaints of  L shoulder/arm pain with unknown injury x greater than 2 weeks.   Denies chest pain or shortness of breath on today's office visit.      Objective   Vital Signs:  /80   Pulse 89   Temp 96.5 °F (35.8 °C)   Resp 16   Ht 154.9 cm (61\")   Wt 63.5 kg (140 lb)   SpO2 98%   BMI 26.45 kg/m²   Estimated body mass index is 26.45 kg/m² as calculated from the following:    Height as of this encounter: 154.9 cm (61\").    Weight as of this encounter: 63.5 kg (140 lb).             Physical Exam  Vitals and nursing note reviewed.   Constitutional:       Appearance: Normal appearance.   HENT:      Head: Normocephalic.      Nose: Nose normal.      Mouth/Throat:      Mouth: Mucous membranes are moist.   Eyes:      Pupils: Pupils are equal, round, and reactive to light.   Cardiovascular:      Rate and Rhythm: Normal rate and regular rhythm.      Pulses: Normal pulses.      Heart sounds: Normal heart sounds.      Comments: No peripheral edema  Pulmonary:      Effort: Pulmonary effort is normal. No respiratory distress.      Breath sounds: Normal breath sounds. No stridor. No wheezing, rhonchi or rales.      Comments: Denies shortness of breath  Chest:      Chest wall: No tenderness.   Musculoskeletal:      Comments: L shoulder/arm pain with unknown injury x greater than 2 weeks.    Skin:     General: Skin is warm.      Capillary Refill: Capillary refill takes less than 2 seconds.   Neurological:      Mental Status: She is alert and oriented to person, place, and time.   Psychiatric:         Behavior: Behavior normal.        Result Review :                   Assessment and Plan "   Diagnoses and all orders for this visit:    1. Acute pain of left shoulder (Primary)  -     XR Shoulder 2+ View Left    We will do an x-ray of the left shoulder at this time.  If this comes back normal we will send her over to orthopedic at this time.  Patient will continue taking over-the-counter ibuprofen and/or Tylenol as needed for this problem.  Patient will not lift any heavy weights but will continue passive range of motion exercises.  Patient agrees with treatment plan at this time.       Follow Up   Return if symptoms worsen or fail to improve.  Patient was given instructions and counseling regarding her condition or for health maintenance advice. Please see specific information pulled into the AVS if appropriate.

## 2023-04-25 NOTE — PROGRESS NOTES
Please call patient with results.   Shoulder Xray is normal except one area is worrisome for impaction.   Does she have an ortho provider?   Yessy

## 2023-04-26 DIAGNOSIS — M25.512 ACUTE PAIN OF LEFT SHOULDER: Primary | ICD-10-CM

## 2023-04-29 NOTE — PATIENT INSTRUCTIONS
We will do an x-ray of the left shoulder at this time.  If this comes back normal we will send her over to orthopedic at this time.  Patient will continue taking over-the-counter ibuprofen and/or Tylenol as needed for this problem.  Patient will not lift any heavy weights but will continue passive range of motion exercises.

## 2023-05-03 DIAGNOSIS — M19.041 PRIMARY OSTEOARTHRITIS OF BOTH HANDS: Chronic | ICD-10-CM

## 2023-05-03 DIAGNOSIS — M19.042 PRIMARY OSTEOARTHRITIS OF BOTH HANDS: Chronic | ICD-10-CM

## 2023-05-04 DIAGNOSIS — R31.29 MICROSCOPIC HEMATURIA: Chronic | ICD-10-CM

## 2023-05-04 DIAGNOSIS — E55.9 VITAMIN D DEFICIENCY: Chronic | ICD-10-CM

## 2023-05-04 DIAGNOSIS — E78.2 MIXED HYPERLIPIDEMIA: Chronic | ICD-10-CM

## 2023-05-04 DIAGNOSIS — E53.8 VITAMIN B12 DEFICIENCY: Chronic | ICD-10-CM

## 2023-05-04 RX ORDER — HYDROCODONE BITARTRATE AND ACETAMINOPHEN 5; 325 MG/1; MG/1
TABLET ORAL
Qty: 90 TABLET | Refills: 0 | Status: SHIPPED | OUTPATIENT
Start: 2023-05-04

## 2023-05-04 NOTE — TELEPHONE ENCOUNTER
Rx Refill Note  Requested Prescriptions     Pending Prescriptions Disp Refills   • HYDROcodone-acetaminophen (NORCO) 5-325 MG per tablet 90 tablet 0     Sig: Take 1 p.o. 3 times daily as needed arthritic pain.      Last office visit with prescribing clinician: Visit date not found   Last telemedicine visit with prescribing clinician: 5/12/2023   Next office visit with prescribing clinician: Visit date not found                         Would you like a call back once the refill request has been completed: [] Yes [] No    If the office needs to give you a call back, can they leave a voicemail: [] Yes [] No    Yeimy Parish MA  05/04/23, 09:06 EDT

## 2023-05-09 LAB
25(OH)D3+25(OH)D2 SERPL-MCNC: 31.6 NG/ML (ref 30–100)
ALBUMIN SERPL-MCNC: 4.6 G/DL (ref 3.5–5.2)
ALBUMIN/GLOB SERPL: 1.7 G/DL
ALP SERPL-CCNC: 63 U/L (ref 39–117)
ALT SERPL-CCNC: 25 U/L (ref 1–33)
APPEARANCE UR: CLEAR
AST SERPL-CCNC: 25 U/L (ref 1–32)
BACTERIA #/AREA URNS HPF: ABNORMAL /HPF
BILIRUB SERPL-MCNC: 1.3 MG/DL (ref 0–1.2)
BILIRUB UR QL STRIP: NEGATIVE
BUN SERPL-MCNC: 20 MG/DL (ref 8–23)
BUN/CREAT SERPL: 23 (ref 7–25)
CALCIUM SERPL-MCNC: 10.2 MG/DL (ref 8.6–10.5)
CASTS URNS MICRO: ABNORMAL
CHLORIDE SERPL-SCNC: 97 MMOL/L (ref 98–107)
CHOLEST SERPL-MCNC: 176 MG/DL (ref 100–199)
CK SERPL-CCNC: 93 U/L (ref 20–180)
CO2 SERPL-SCNC: 34.4 MMOL/L (ref 22–29)
COLOR UR: YELLOW
CREAT SERPL-MCNC: 0.87 MG/DL (ref 0.57–1)
EGFRCR SERPLBLD CKD-EPI 2021: 72.2 ML/MIN/1.73
EPI CELLS #/AREA URNS HPF: ABNORMAL /HPF
ERYTHROCYTE [DISTWIDTH] IN BLOOD BY AUTOMATED COUNT: 13 % (ref 12.3–15.4)
GLOBULIN SER CALC-MCNC: 2.7 GM/DL
GLUCOSE SERPL-MCNC: 102 MG/DL (ref 65–99)
GLUCOSE UR QL STRIP: NEGATIVE
HCT VFR BLD AUTO: 43.8 % (ref 34–46.6)
HDL SERPL-SCNC: 30.5 UMOL/L
HDLC SERPL-MCNC: 49 MG/DL
HGB BLD-MCNC: 14.3 G/DL (ref 12–15.9)
HGB UR QL STRIP: ABNORMAL
KETONES UR QL STRIP: NEGATIVE
LDL SERPL QN: 20.9 NM
LDL SERPL-SCNC: 1544 NMOL/L
LDL SMALL SERPL-SCNC: 723 NMOL/L
LDLC SERPL CALC-MCNC: 104 MG/DL (ref 0–99)
LEUKOCYTE ESTERASE UR QL STRIP: NEGATIVE
MCH RBC QN AUTO: 27.4 PG (ref 26.6–33)
MCHC RBC AUTO-ENTMCNC: 32.6 G/DL (ref 31.5–35.7)
MCV RBC AUTO: 84.1 FL (ref 79–97)
METHYLMALONATE SERPL-SCNC: 181 NMOL/L (ref 0–378)
NITRITE UR QL STRIP: NEGATIVE
PH UR STRIP: 7 [PH] (ref 5–8)
PLATELET # BLD AUTO: 208 10*3/MM3 (ref 140–450)
POTASSIUM SERPL-SCNC: 3.3 MMOL/L (ref 3.5–5.2)
PROT SERPL-MCNC: 7.3 G/DL (ref 6–8.5)
PROT UR QL STRIP: NEGATIVE
RBC # BLD AUTO: 5.21 10*6/MM3 (ref 3.77–5.28)
RBC #/AREA URNS HPF: ABNORMAL /HPF
SODIUM SERPL-SCNC: 142 MMOL/L (ref 136–145)
SP GR UR STRIP: 1.02 (ref 1–1.03)
T3FREE SERPL-MCNC: 3.6 PG/ML (ref 2–4.4)
T4 FREE SERPL-MCNC: 1.31 NG/DL (ref 0.93–1.7)
TRIGL SERPL-MCNC: 132 MG/DL (ref 0–149)
TSH SERPL DL<=0.005 MIU/L-ACNC: 1.95 UIU/ML (ref 0.27–4.2)
UROBILINOGEN UR STRIP-MCNC: ABNORMAL MG/DL
WBC # BLD AUTO: 5.09 10*3/MM3 (ref 3.4–10.8)
WBC #/AREA URNS HPF: ABNORMAL /HPF

## 2023-05-15 ENCOUNTER — TELEPHONE (OUTPATIENT)
Dept: INTERNAL MEDICINE | Facility: CLINIC | Age: 68
End: 2023-05-15

## 2023-05-15 DIAGNOSIS — E87.6 HYPOKALEMIA: Primary | ICD-10-CM

## 2023-05-15 RX ORDER — POTASSIUM CHLORIDE 20 MEQ/1
TABLET, EXTENDED RELEASE ORAL
Qty: 90 TABLET | Refills: 3 | Status: SHIPPED | OUTPATIENT
Start: 2023-05-15

## 2023-05-15 NOTE — TELEPHONE ENCOUNTER
Caller: Connie Posey    Relationship to patient: Self    Best call back number:[phone]    Patient is needing: PATIENT IS CALLING TO STATE SHE IS RETURNING A CALL TO [facility].  SHE IS NEEDING A REFERRAL TO JAVAD ORTEGA AT [facility].  SHE STATES SHE DOES NOT WANT TO GO TO THE [facility] LOCATION .    PHONE #[phone]

## 2023-05-18 ENCOUNTER — TELEPHONE (OUTPATIENT)
Dept: INTERNAL MEDICINE | Facility: CLINIC | Age: 68
End: 2023-05-18
Payer: COMMERCIAL

## 2023-06-28 PROBLEM — E87.6 HYPOKALEMIA: Status: ACTIVE | Noted: 2023-06-28

## 2023-08-17 ENCOUNTER — OFFICE VISIT (OUTPATIENT)
Dept: INTERNAL MEDICINE | Facility: CLINIC | Age: 68
End: 2023-08-17
Payer: MEDICARE

## 2023-08-17 VITALS
SYSTOLIC BLOOD PRESSURE: 128 MMHG | HEART RATE: 92 BPM | BODY MASS INDEX: 26.24 KG/M2 | TEMPERATURE: 98 F | RESPIRATION RATE: 16 BRPM | DIASTOLIC BLOOD PRESSURE: 78 MMHG | WEIGHT: 139 LBS | HEIGHT: 61 IN | OXYGEN SATURATION: 95 %

## 2023-08-17 DIAGNOSIS — E53.8 VITAMIN B12 DEFICIENCY: Chronic | ICD-10-CM

## 2023-08-17 DIAGNOSIS — Z91.09 MULTIPLE ENVIRONMENTAL ALLERGIES: Chronic | ICD-10-CM

## 2023-08-17 DIAGNOSIS — M85.89 OSTEOPENIA OF MULTIPLE SITES: Chronic | ICD-10-CM

## 2023-08-17 DIAGNOSIS — I10 BENIGN ESSENTIAL HYPERTENSION: Chronic | ICD-10-CM

## 2023-08-17 DIAGNOSIS — Z87.442 HISTORY OF KIDNEY STONES: Chronic | ICD-10-CM

## 2023-08-17 DIAGNOSIS — E78.2 MIXED HYPERLIPIDEMIA: Chronic | ICD-10-CM

## 2023-08-17 DIAGNOSIS — Z86.010 HISTORY OF COLON POLYPS: Chronic | ICD-10-CM

## 2023-08-17 DIAGNOSIS — Z78.0 POSTMENOPAUSAL STATE: Chronic | ICD-10-CM

## 2023-08-17 DIAGNOSIS — G89.29 CHRONIC MIDLINE LOW BACK PAIN WITH LEFT-SIDED SCIATICA: Chronic | ICD-10-CM

## 2023-08-17 DIAGNOSIS — E87.6 HYPOKALEMIA: Chronic | ICD-10-CM

## 2023-08-17 DIAGNOSIS — R31.29 MICROSCOPIC HEMATURIA: Chronic | ICD-10-CM

## 2023-08-17 DIAGNOSIS — E55.9 VITAMIN D DEFICIENCY: Chronic | ICD-10-CM

## 2023-08-17 DIAGNOSIS — Z00.00 ENCOUNTER FOR SUBSEQUENT ANNUAL WELLNESS VISIT (AWV) IN MEDICARE PATIENT: Primary | ICD-10-CM

## 2023-08-17 DIAGNOSIS — G43.709 CHRONIC MIGRAINE W/O AURA W/O STATUS MIGRAINOSUS, NOT INTRACTABLE: Chronic | ICD-10-CM

## 2023-08-17 DIAGNOSIS — M54.42 CHRONIC MIDLINE LOW BACK PAIN WITH LEFT-SIDED SCIATICA: Chronic | ICD-10-CM

## 2023-08-17 DIAGNOSIS — Z51.81 THERAPEUTIC DRUG MONITORING: ICD-10-CM

## 2023-08-17 DIAGNOSIS — H90.3 BILATERAL SENSORINEURAL HEARING LOSS: Chronic | ICD-10-CM

## 2023-08-17 PROBLEM — M54.16 LEFT LUMBAR RADICULOPATHY: Status: RESOLVED | Noted: 2022-10-04 | Resolved: 2023-08-17

## 2023-08-17 RX ORDER — TRIAMTERENE AND HYDROCHLOROTHIAZIDE 37.5; 25 MG/1; MG/1
CAPSULE ORAL
Qty: 30 CAPSULE | Refills: 1 | Status: SHIPPED | OUTPATIENT
Start: 2023-08-17 | End: 2023-08-18

## 2023-08-17 RX ORDER — ROSUVASTATIN CALCIUM 10 MG/1
TABLET, COATED ORAL
Qty: 90 TABLET | Refills: 3 | Status: SHIPPED | OUTPATIENT
Start: 2023-08-17

## 2023-08-17 NOTE — PROGRESS NOTES
The ABCs of the Annual Wellness Visit  Subsequent Medicare Wellness Visit    Subjective      Connie Posey is a 67 y.o. female who presents for a Subsequent Medicare Wellness Visit.    The following portions of the patient's history were reviewed and   updated as appropriate: allergies, current medications, past family history, past medical history, past social history, past surgical history, and problem list.    Compared to one year ago, the patient feels her physical   health is the same.    Compared to one year ago, the patient feels her mental   health is the same.    Recent Hospitalizations:  She was not admitted to the hospital during the last year.       Current Medical Providers:  Patient Care Team:  Lenny Guadarrama MD as PCP - General (Internal Medicine)    Outpatient Medications Prior to Visit   Medication Sig Dispense Refill    Cyanocobalamin 1000 MCG sublingual tablet Place  under the tongue.      fexofenadine (ALLEGRA) 180 MG tablet 1 tablet.      HYDROcodone-acetaminophen (NORCO) 5-325 MG per tablet Take 1 tablet by mouth 3 (Three) Times a Day As Needed for Moderate Pain. Take 1 p.o. 3 times daily as needed arthritic pain. 90 tablet 0    ketoconazole (NIZORAL) 2 % shampoo LATHER ON TO SCALP AND FACE AND LET SIT 3-5 MINUTES PRIOR TO RINSING. USE 2-3 TIMES PER WEEK      Magnesium Bisglycinate Dihyd powder       Multiple Vitamins-Minerals (Eye Vitamins) capsule Take  by mouth Daily.      PROBIOTIC PRODUCT PO Take  by mouth.      rizatriptan MLT (MAXALT-MLT) 10 MG disintegrating tablet May repeat in 2 hours if needed 9 tablet 0    ciprofloxacin (Cipro) 500 MG tablet Take 1 p.o. twice daily for traveler's diarrhea 20 tablet 0    hydroCHLOROthiazide (HYDRODIURIL) 25 MG tablet Take 1 tablet by mouth Daily. 90 tablet 1    potassium chloride (K-DUR,KLOR-CON) 20 MEQ CR tablet Take 1 p.o. daily for low potassium 90 tablet 3    rosuvastatin (CRESTOR) 5 MG tablet Take 1 tablet by mouth Every Night. 90 tablet 1     "Black Pepper-Turmeric 3-500 MG capsule Take  by mouth. (Patient not taking: Reported on 6/28/2023)      triamcinolone (KENALOG) 0.1 % cream Apply  topically to the appropriate area as directed.       No facility-administered medications prior to visit.       Opioid medication/s are on active medication list.  and I have evaluated her active treatment plan and pain score trends (see table).  There were no vitals filed for this visit.  I have reviewed the chart for potential of high risk medication and harmful drug interactions in the elderly.          Aspirin is not on active medication list.  Aspirin use is not indicated based on review of current medical condition/s. Risk of harm outweighs potential benefits.  .    Patient Active Problem List   Diagnosis    Allergic rhinitis    Chronic midline low back pain with left-sided sciatica    Dermatochalasis of both eyelids    Benign essential hypertension    Microscopic hematuria    Chronic migraine w/o aura w/o status migrainosus, not intractable    Hyperlipidemia    Primary osteoarthritis of both hands    Seborrheic dermatitis    PVD (posterior vitreous detachment), bilateral    Nuclear sclerotic cataract of both eyes    Multiple environmental allergies    Vitamin D deficiency    Therapeutic drug monitoring    Vitamin B12 deficiency    History of colon polyps, 7/1/2022--hyperplastic x1.    History of kidney stones    Osteopenia of multiple sites    Postmenopausal state    Bilateral sensorineural hearing loss    Hypokalemia     Advance Care Planning   Advance Care Planning     Advance Directive is not on file.  ACP discussion was held with the patient during this visit. Patient does not have an advance directive, information provided.     Objective    Vitals:    08/17/23 0937   BP: 128/78   Pulse: 92   Resp: 16   Temp: 98 øF (36.7 øC)   TempSrc: Temporal   SpO2: 95%   Weight: 63 kg (139 lb)   Height: 154 cm (60.63\")     Estimated body mass index is 26.59 kg/mý as " "calculated from the following:    Height as of this encounter: 154 cm (60.63\").    Weight as of this encounter: 63 kg (139 lb).           Does the patient have evidence of cognitive impairment?   No            HEALTH RISK ASSESSMENT    Smoking Status:  Social History     Tobacco Use   Smoking Status Former    Packs/day: 1.50    Years: 13.00    Pack years: 19.50    Types: Cigarettes    Start date: 1972    Quit date: 1985    Years since quittin.6   Smokeless Tobacco Never     Alcohol Consumption:  Social History     Substance and Sexual Activity   Alcohol Use Yes     Fall Risk Screen:    STEADI Fall Risk Assessment was completed, and patient is at LOW risk for falls.Assessment completed on:2023    Depression Screenin/17/2023     9:47 AM   PHQ-2/PHQ-9 Depression Screening   Little Interest or Pleasure in Doing Things 0-->not at all   Feeling Down, Depressed or Hopeless 0-->not at all   PHQ-9: Brief Depression Severity Measure Score 0       Health Habits and Functional and Cognitive Screenin/17/2023     9:47 AM   Functional & Cognitive Status   Do you have difficulty preparing food and eating? No   Do you have difficulty bathing yourself, getting dressed or grooming yourself? No   Do you have difficulty using the toilet? No   Do you have difficulty moving around from place to place? No   Do you have trouble with steps or getting out of a bed or a chair? No   Current Diet Well Balanced Diet   Dental Exam Up to date   Eye Exam Not up to date   Exercise (times per week) 6 times per week   Current Exercises Include Walking;Stationary Bicycling/Spin Class   Do you need help using the phone?  No   Are you deaf or do you have serious difficulty hearing?  No   Do you need help to go to places out of walking distance? No   Do you need help shopping? No   Do you need help preparing meals?  No   Do you need help with housework?  No   Do you need help with laundry? No   Do you need help taking " your medications? No   Do you need help managing money? No   Do you ever drive or ride in a car without wearing a seat belt? No   Have you felt unusual stress, anger or loneliness in the last month? No   Who do you live with? Child   If you need help, do you have trouble finding someone available to you? No   Have you been bothered in the last four weeks by sexual problems? No   Do you have difficulty concentrating, remembering or making decisions? No       Age-appropriate Screening Schedule:  Refer to the list below for future screening recommendations based on patient's age, sex and/or medical conditions. Orders for these recommended tests are listed in the plan section. The patient has been provided with a written plan.    Health Maintenance   Topic Date Due    DXA SCAN  10/01/2022    MAMMOGRAM  02/04/2023    INFLUENZA VACCINE  10/01/2023    LIPID PANEL  05/04/2024    ANNUAL WELLNESS VISIT  08/17/2024    COLORECTAL CANCER SCREENING  07/01/2027    HEPATITIS C SCREENING  Completed    Pneumococcal Vaccine 65+  Completed    ZOSTER VACCINE  Completed    COVID-19 Vaccine  Discontinued    TDAP/TD VACCINES  Discontinued                  CMS Preventative Services Quick Reference  Risk Factors Identified During Encounter:    Immunizations Discussed/Encouraged: Influenza and COVID19    The above risks/problems have been discussed with the patient.  Pertinent information has been shared with the patient in the After Visit Summary.    Diagnoses and all orders for this visit:    1. Encounter for subsequent annual wellness visit (AWV) in Medicare patient (Primary)    2. Hyperlipidemia  -     rosuvastatin (Crestor) 10 MG tablet; Take 1 p.o. daily for high cholesterol  Dispense: 90 tablet; Refill: 3    3. Benign essential hypertension  -     triamterene-hydrochlorothiazide (Dyazide) 37.5-25 MG per capsule; Take 1 p.o. every morning for high blood pressure  Dispense: 30 capsule; Refill: 1    4. Hypokalemia    5. Microscopic  hematuria    6. History of kidney stones    7. Osteopenia of multiple sites    8. Postmenopausal state    9. Vitamin B12 deficiency    10. Vitamin D deficiency    11. Multiple environmental allergies    12. History of colon polyps, 7/1/2022--hyperplastic x1.    13. Chronic migraine w/o aura w/o status migrainosus, not intractable    14. Chronic midline low back pain with left-sided sciatica    15. Bilateral sensorineural hearing loss    16. Therapeutic drug monitoring      Reviewed the most recent lab work as follows: A CMP obtained about a month ago showed a potassium of 3.4.  3 months ago her potassium was 3.3.  Glucose was 147.  Methylmalonic acid is normal at 181.  Thyroid function tests are normal.  Urinalysis reveals 3-5 red blood cells.  Total cholesterol is 176, triglyceride 232, LDL particle number mildly elevated 1544, HDL particle number is borderline normal at 30.5.  CBC normal.  CPK normal.    Patient has persistent low potassium and I think this is related to her medication.  This is a unstable problem that needs to be evaluated today and justifies separate evaluation and management coding.  She also has hypertension and this seems to be controlled.  I think the HydroDIURIL is the primary culprit and causing the low potassium.  She also has hyperlipidemia which has not as good control as it should be and she is only on a small dose of generic Crestor.    Plan is as follows: Discontinue hydrochlorothiazide and potassium.  Start Dyazide 37.5 mg daily.  Increase Crestor to 10 mg/day.  Is fine to take it in the morning which she is doing now.  I will have her follow-up in about 4 weeks so I can reassess her blood pressure and I can send her to the lab that day after the visit to check on her potassium and renal function.      Follow Up:   Next Medicare Wellness visit to be scheduled in 1 year.      An After Visit Summary and PPPS were made available to the patient.

## 2023-08-18 RX ORDER — TRIAMTERENE AND HYDROCHLOROTHIAZIDE 37.5; 25 MG/1; MG/1
CAPSULE ORAL
Qty: 90 CAPSULE | Refills: 3 | Status: SHIPPED | OUTPATIENT
Start: 2023-08-18

## 2023-08-27 DIAGNOSIS — M19.041 PRIMARY OSTEOARTHRITIS OF BOTH HANDS: Chronic | ICD-10-CM

## 2023-08-27 DIAGNOSIS — M19.042 PRIMARY OSTEOARTHRITIS OF BOTH HANDS: Chronic | ICD-10-CM

## 2023-08-28 RX ORDER — HYDROCODONE BITARTRATE AND ACETAMINOPHEN 5; 325 MG/1; MG/1
1 TABLET ORAL 3 TIMES DAILY PRN
Qty: 90 TABLET | Refills: 0 | Status: SHIPPED | OUTPATIENT
Start: 2023-08-28

## 2023-08-28 NOTE — TELEPHONE ENCOUNTER
Rx Refill Note  Requested Prescriptions     Pending Prescriptions Disp Refills    HYDROcodone-acetaminophen (NORCO) 5-325 MG per tablet 90 tablet 0     Sig: Take 1 tablet by mouth 3 (Three) Times a Day As Needed for Moderate Pain. Take 1 p.o. 3 times daily as needed arthritic pain.      Last office visit with prescribing clinician: 6/28/2023   Last telemedicine visit with prescribing clinician: Visit date not found   Next office visit with prescribing clinician: Visit date not found     Yeimy Parish MA  08/28/23, 11:27 EDT

## 2023-09-14 ENCOUNTER — OFFICE VISIT (OUTPATIENT)
Dept: INTERNAL MEDICINE | Facility: CLINIC | Age: 68
End: 2023-09-14
Payer: MEDICARE

## 2023-09-14 VITALS
BODY MASS INDEX: 26.43 KG/M2 | HEIGHT: 61 IN | HEART RATE: 62 BPM | OXYGEN SATURATION: 98 % | WEIGHT: 140 LBS | DIASTOLIC BLOOD PRESSURE: 70 MMHG | SYSTOLIC BLOOD PRESSURE: 118 MMHG | TEMPERATURE: 96.5 F

## 2023-09-14 DIAGNOSIS — I10 BENIGN ESSENTIAL HYPERTENSION: Chronic | ICD-10-CM

## 2023-09-14 DIAGNOSIS — E87.6 HYPOKALEMIA: Primary | Chronic | ICD-10-CM

## 2023-09-14 LAB
ALBUMIN SERPL-MCNC: 4.7 G/DL (ref 3.5–5.2)
ALBUMIN/GLOB SERPL: 1.6 G/DL
ALP SERPL-CCNC: 60 U/L (ref 39–117)
ALT SERPL-CCNC: 23 U/L (ref 1–33)
AST SERPL-CCNC: 19 U/L (ref 1–32)
BILIRUB SERPL-MCNC: 2 MG/DL (ref 0–1.2)
BUN SERPL-MCNC: 20 MG/DL (ref 8–23)
BUN/CREAT SERPL: 24.7 (ref 7–25)
CALCIUM SERPL-MCNC: 10.5 MG/DL (ref 8.6–10.5)
CHLORIDE SERPL-SCNC: 99 MMOL/L (ref 98–107)
CO2 SERPL-SCNC: 30.7 MMOL/L (ref 22–29)
CREAT SERPL-MCNC: 0.81 MG/DL (ref 0.57–1)
EGFRCR SERPLBLD CKD-EPI 2021: 78.2 ML/MIN/1.73
GLOBULIN SER CALC-MCNC: 2.9 GM/DL
GLUCOSE SERPL-MCNC: 88 MG/DL (ref 65–99)
POTASSIUM SERPL-SCNC: 3.9 MMOL/L (ref 3.5–5.2)
PROT SERPL-MCNC: 7.6 G/DL (ref 6–8.5)
SODIUM SERPL-SCNC: 140 MMOL/L (ref 136–145)

## 2023-09-14 PROCEDURE — 1160F RVW MEDS BY RX/DR IN RCRD: CPT | Performed by: NURSE PRACTITIONER

## 2023-09-14 PROCEDURE — 3074F SYST BP LT 130 MM HG: CPT | Performed by: NURSE PRACTITIONER

## 2023-09-14 PROCEDURE — 3078F DIAST BP <80 MM HG: CPT | Performed by: NURSE PRACTITIONER

## 2023-09-14 PROCEDURE — 99213 OFFICE O/P EST LOW 20 MIN: CPT | Performed by: NURSE PRACTITIONER

## 2023-09-14 PROCEDURE — 1159F MED LIST DOCD IN RCRD: CPT | Performed by: NURSE PRACTITIONER

## 2023-09-14 RX ORDER — POTASSIUM CHLORIDE 20 MEQ/1
20 TABLET, EXTENDED RELEASE ORAL
COMMUNITY
Start: 2023-07-28 | End: 2023-09-14

## 2023-09-14 NOTE — PROGRESS NOTES
"Chief Complaint  Labs Only (Follow up on potassium)    Subjective        Connie Posey presents to [facility]  History of Present Illness    Patient is a pleasant 68-year-old female who typically sees Dr. Guadarrama here in the office.  Patient is here today to follow-up on hypocalcemia following a blood pressure medication change.  No other problems on today's office visit.    Objective   Vital Signs:  /70   Pulse 62   Temp 96.5 °F (35.8 °C)   Ht 154 cm (60.63\")   Wt 63.5 kg (140 lb)   SpO2 98%   BMI 26.78 kg/m²   Estimated body mass index is 26.78 kg/m² as calculated from the following:    Height as of this encounter: 154 cm (60.63\").    Weight as of this encounter: 63.5 kg (140 lb).       Physical Exam  Vitals and nursing note reviewed.   Constitutional:       Appearance: Normal appearance.   HENT:      Head: Normocephalic.      Nose: Nose normal.      Mouth/Throat:      Mouth: Mucous membranes are moist.   Eyes:      Pupils: Pupils are equal, round, and reactive to light.   Cardiovascular:      Rate and Rhythm: Normal rate and regular rhythm.      Pulses: Normal pulses.      Heart sounds: Normal heart sounds.      Comments: No peripheral edema noted.   Pulmonary:      Effort: Pulmonary effort is normal. No respiratory distress.      Breath sounds: Normal breath sounds. No stridor. No wheezing, rhonchi or rales.      Comments: Denies SOB  Chest:      Chest wall: No tenderness.   Musculoskeletal:         General: Normal range of motion.   Skin:     General: Skin is warm.      Capillary Refill: Capillary refill takes less than 2 seconds.   Neurological:      Mental Status: She is alert and oriented to person, place, and time.   Psychiatric:         Behavior: Behavior normal.      Result Review :    Common labs          5/4/2023    09:09 6/28/2023    13:34 9/6/2023    12:26   Common Labs   Glucose 102  147     BUN 20  20     Creatinine 0.87  0.82     Sodium 142  140     Potassium " 3.3  3.4  3.4       Chloride 97  97     Calcium 10.2  9.7     Total Protein 7.3      Albumin 4.6  4.6     Total Bilirubin 1.3  1.1     Alkaline Phosphatase 63  64     AST (SGOT) 25  23     ALT (SGPT) 25  24     WBC 5.09      Hemoglobin 14.3      Hematocrit 43.8      Platelets 208      Total Cholesterol 176      Triglycerides 132         Details          This result is from an external source.               Office Visit with Lenny Guadarrama MD (08/17/2023)   SCANNED - LABS (09/06/2023)          Assessment and Plan   Diagnoses and all orders for this visit:    1. Hypokalemia (Primary)  -     Comprehensive Metabolic Panel    2. Benign essential hypertension  -     Comprehensive Metabolic Panel    We will get potassium level on today's office visit.  We will contact her with results.  She will continue current medications at this time.  Drink plenty of fluids and eat healthy.       I spent 25 minutes caring for Connie on this date of service. This time includes time spent by me in the following activities:preparing for the visit, reviewing tests, obtaining and/or reviewing a separately obtained history, performing a medically appropriate examination and/or evaluation , counseling and educating the patient/family/caregiver, ordering medications, tests, or procedures, referring and communicating with other health care professionals , documenting information in the medical record, independently interpreting results and communicating that information with the patient/family/caregiver, and care coordination  Follow Up   Return if symptoms worsen or fail to improve.  Patient was given instructions and counseling regarding her condition or for health maintenance advice. Please see specific information pulled into the AVS if appropriate.       Answers submitted by the patient for this visit:  Other (Submitted on 9/13/2023)  Please describe your symptoms.: Follow up on potassium level/ Dr Guadarrama changed rx hydrochlorothiazide  25 mg to Triamterene 37.5/hydrochlorothiazide 25 mg combination drug to treat low potassium  Have you had these symptoms before?: Yes  How long have you been having these symptoms?: Greater than 2 weeks  Please list any medications you are currently taking for this condition.: Triamterene 37.5/hydrochlorothiazide 25 mg combination drug  Please describe any probable cause for these symptoms. : Low potassium  Primary Reason for Visit (Submitted on 9/13/2023)  What is the primary reason for your visit?: Other

## 2023-09-19 DIAGNOSIS — I10 BENIGN ESSENTIAL HYPERTENSION: Chronic | ICD-10-CM

## 2023-09-19 NOTE — TELEPHONE ENCOUNTER
Rx Refill Note  Requested Prescriptions     Pending Prescriptions Disp Refills    triamterene-hydrochlorothiazide (DYAZIDE) 37.5-25 MG per capsule 90 capsule 1     Sig: TAKE 1 CAPSULE BY MOUTH EVERY MORNING FOR HIGH BLOOD PRESSURE      Last office visit with prescribing clinician: 8/17/2023         Yeimy Parish MA  09/19/23, 15:19 EDT

## 2023-09-20 RX ORDER — TRIAMTERENE AND HYDROCHLOROTHIAZIDE 37.5; 25 MG/1; MG/1
CAPSULE ORAL
Qty: 90 CAPSULE | Refills: 1 | Status: SHIPPED | OUTPATIENT
Start: 2023-09-20

## 2023-11-03 DIAGNOSIS — M19.042 PRIMARY OSTEOARTHRITIS OF BOTH HANDS: Chronic | ICD-10-CM

## 2023-11-03 DIAGNOSIS — M19.041 PRIMARY OSTEOARTHRITIS OF BOTH HANDS: Chronic | ICD-10-CM

## 2023-11-03 NOTE — TELEPHONE ENCOUNTER
[facility] SCANNED IN   Rx Refill Note  Requested Prescriptions     Pending Prescriptions Disp Refills    HYDROcodone-acetaminophen (NORCO) 5-325 MG per tablet 90 tablet 0     Sig: Take 1 tablet by mouth 3 (Three) Times a Day As Needed for Moderate Pain. Take 1 p.o. 3 times daily as needed arthritic pain.      Last office visit with prescribing clinician: 8/17/2023       Yeimy Parish MA  11/03/23, 15:28 EDT

## 2023-11-06 RX ORDER — HYDROCODONE BITARTRATE AND ACETAMINOPHEN 5; 325 MG/1; MG/1
1 TABLET ORAL 3 TIMES DAILY PRN
Qty: 90 TABLET | Refills: 0 | Status: SHIPPED | OUTPATIENT
Start: 2023-11-06

## 2023-11-07 ENCOUNTER — TELEPHONE (OUTPATIENT)
Dept: INTERNAL MEDICINE | Facility: CLINIC | Age: 68
End: 2023-11-07
Payer: COMMERCIAL

## 2023-11-09 ENCOUNTER — TELEPHONE (OUTPATIENT)
Dept: INTERNAL MEDICINE | Facility: CLINIC | Age: 68
End: 2023-11-09
Payer: COMMERCIAL

## 2023-11-09 DIAGNOSIS — Z12.31 SCREENING MAMMOGRAM FOR BREAST CANCER: Primary | ICD-10-CM

## 2023-11-09 DIAGNOSIS — Z78.0 POSTMENOPAUSAL STATE: ICD-10-CM

## 2023-11-09 DIAGNOSIS — M85.89 OSTEOPENIA OF MULTIPLE SITES: ICD-10-CM

## 2023-11-09 NOTE — TELEPHONE ENCOUNTER
Caller: Connie Posey    Relationship: Self    Best call back number: [phone] (Mobile)     What orders are you requesting (i.e. lab or imaging): MAMMOGRAM AND BONE DENSITY    In what timeframe would the patient need to come in: ASAP    Where will you receive your lab/imaging services: [facility]    Additional notes: PLEASE CONTACT PATIENT TO ADVISE.          THANKS

## 2023-11-14 DIAGNOSIS — M19.041 PRIMARY OSTEOARTHRITIS OF BOTH HANDS: Chronic | ICD-10-CM

## 2023-11-14 DIAGNOSIS — M19.042 PRIMARY OSTEOARTHRITIS OF BOTH HANDS: Chronic | ICD-10-CM

## 2023-11-14 NOTE — TELEPHONE ENCOUNTER
Caller: Connie Posey    Relationship: Self    Best call back number: [phone]    Requested Prescriptions:   Requested Prescriptions     Pending Prescriptions Disp Refills    HYDROcodone-acetaminophen (NORCO) 5-325 MG per tablet 90 tablet 0     Sig: Take 1 tablet by mouth 3 (Three) Times a Day As Needed for Moderate Pain. Take 1 p.o. 3 times daily as needed arthritic pain.        Pharmacy where request should be sent: [facility] [id number] [address]  AT [address] [phone] [facility] [phone]      Last office visit with prescribing clinician: 8/17/2023   Last telemedicine visit with prescribing clinician: Visit date not found   Next office visit with prescribing clinician: Visit date not found     Additional details provided by patient: PATIENT STATES SHE CANCELLED THIS PRESCRIPTION AT [facility] DUE TO THE COST AND IS NEEDING A NEW PRESCRIPTION SENT TO [facility] SO SHE CAN USE A COUPON.    PATIENT STATES SHE WANTED TO MAKE SURE DR. TAYLOR REMEMBERED TO MAKE THE PRESCRIPTION A PATIENT D.A.W.     PATIENT IS REQUESTING A CALL BACK.     Does the patient have less than a 3 day supply:  [x] Yes  [] No    Would you like a call back once the refill request has been completed: [x] Yes [] No    If the office needs to give you a call back, can they leave a voicemail: [x] Yes [] No    Daisy Nye, PCT   11/14/23 11:50 EST

## 2023-11-15 RX ORDER — HYDROCODONE BITARTRATE AND ACETAMINOPHEN 5; 325 MG/1; MG/1
TABLET ORAL
Qty: 90 TABLET | Refills: 0 | Status: SHIPPED | OUTPATIENT
Start: 2023-11-15

## 2024-09-12 ENCOUNTER — OFFICE VISIT (OUTPATIENT)
Dept: INTERNAL MEDICINE | Facility: CLINIC | Age: 69
End: 2024-09-12
Payer: MEDICARE

## 2024-09-12 VITALS
WEIGHT: 141 LBS | SYSTOLIC BLOOD PRESSURE: 128 MMHG | HEIGHT: 61 IN | TEMPERATURE: 97.5 F | BODY MASS INDEX: 26.62 KG/M2 | DIASTOLIC BLOOD PRESSURE: 70 MMHG | OXYGEN SATURATION: 97 % | RESPIRATION RATE: 16 BRPM | HEART RATE: 80 BPM

## 2024-09-12 DIAGNOSIS — M25.562 ACUTE PAIN OF LEFT KNEE: ICD-10-CM

## 2024-09-12 DIAGNOSIS — M25.462 EFFUSION OF LEFT KNEE: Primary | ICD-10-CM

## 2024-09-12 PROCEDURE — 1159F MED LIST DOCD IN RCRD: CPT | Performed by: NURSE PRACTITIONER

## 2024-09-12 PROCEDURE — 3078F DIAST BP <80 MM HG: CPT | Performed by: NURSE PRACTITIONER

## 2024-09-12 PROCEDURE — 1125F AMNT PAIN NOTED PAIN PRSNT: CPT | Performed by: NURSE PRACTITIONER

## 2024-09-12 PROCEDURE — 99213 OFFICE O/P EST LOW 20 MIN: CPT | Performed by: NURSE PRACTITIONER

## 2024-09-12 PROCEDURE — 1160F RVW MEDS BY RX/DR IN RCRD: CPT | Performed by: NURSE PRACTITIONER

## 2024-09-12 PROCEDURE — 3074F SYST BP LT 130 MM HG: CPT | Performed by: NURSE PRACTITIONER

## 2024-09-12 RX ORDER — AZELASTINE 1 MG/ML
2 SPRAY, METERED NASAL 2 TIMES DAILY
COMMUNITY

## 2024-09-12 NOTE — PROGRESS NOTES
"Chief Complaint  left knee pain (Swelling hurst back and front off and on for a few months)    Subjective        Connie Posey presents to [facility]  History of Present Illness  History of Present Illness  The patient is a 69-year-old female who presents for evaluation of knee swelling/pain.    She reports that her knee swelling has been progressively worsening. She suspects the swelling might be due to arthritis or fluid accumulation, but does not believe it to be gout. She has not previously consulted an orthopedic specialist.    She performs exercises for her sciatica, which she finds beneficial. She applies a heating pad to her back, which provides some relief. She also uses CBD oil.    Despite being advised against it, she takes ibuprofen with triamterene without experiencing any adverse effects. She was previously taking turmeric but discontinued its use prior to surgery.    She recently returned from a trip to [place]. She reports no pain while walking.    Supplemental Information:  She has arthritis in her big toe.       Objective   Vital Signs:  /70   Pulse 80   Temp 97.5 °F (36.4 °C)   Resp 16   Ht 154 cm (60.63\")   Wt 64 kg (141 lb)   SpO2 97%   BMI 26.97 kg/m²   Estimated body mass index is 26.97 kg/m² as calculated from the following:    Height as of this encounter: 154 cm (60.63\").    Weight as of this encounter: 64 kg (141 lb).       BMI is >= 25 and <30. (Overweight) The following options were offered after discussion;: exercise counseling/recommendations and nutrition counseling/recommendations      Physical Exam  Vitals and nursing note reviewed.   Constitutional:       Appearance: Normal appearance.   HENT:      Head: Normocephalic.      Nose: Nose normal.      Mouth/Throat:      Mouth: Mucous membranes are moist.   Eyes:      Pupils: Pupils are equal, round, and reactive to light.   Cardiovascular:      Rate and Rhythm: Normal rate and regular " rhythm.      Pulses: Normal pulses.      Heart sounds: Normal heart sounds.      Comments: No peripheral edema  Pulmonary:      Effort: Pulmonary effort is normal. No respiratory distress.      Breath sounds: Normal breath sounds. No stridor. No wheezing, rhonchi or rales.      Comments: Denies shortness of breath  Chest:      Chest wall: No tenderness.   Musculoskeletal:         General: Swelling and tenderness present. No signs of injury.      Comments: L knee pain worse when ambulating   Skin:     General: Skin is warm.      Capillary Refill: Capillary refill takes less than 2 seconds.   Neurological:      Mental Status: She is alert and oriented to person, place, and time.   Psychiatric:         Behavior: Behavior normal.        Physical Exam  Vital Signs  Blood pressure reading is 128/70.     Result Review :          Results                Assessment and Plan     Diagnoses and all orders for this visit:    1. Effusion of left knee (Primary)  -     Ambulatory Referral to Orthopedic Surgery    2. Acute pain of left knee  -     Ambulatory Referral to Orthopedic Surgery      Assessment & Plan  1. Knee Effusion.  The presence of fluid in her knee suggests a possible effusion, likely resulting from arthritis and wear and tear. She reports that the swelling has been progressively getting worse. She uses CBD oil balm, which she finds effective. A referral to orthopedics will be made for further evaluation and management. Dr. Zain Miller at [place] will be the referred orthopedic specialist. He will likely perform x-rays and discuss potential treatments, including steroidal injections if necessary. She is advised to keep her phone on for scheduling the appointment, which has been marked as urgent.           I spent 20 minutes caring for Connie on this date of service. This time includes time spent by me in the following activities:preparing for the visit, reviewing tests, obtaining and/or reviewing a separately  obtained history, performing a medically appropriate examination and/or evaluation , counseling and educating the patient/family/caregiver, ordering medications, tests, or procedures, referring and communicating with other health care professionals , documenting information in the medical record, independently interpreting results and communicating that information with the patient/family/caregiver, and care coordination  Follow Up     Return if symptoms worsen or fail to improve.  Patient was given instructions and counseling regarding her condition or for health maintenance advice. Please see specific information pulled into the AVS if appropriate.     Patient or patient representative verbalized consent for the use of Ambient Listening during the visit with  ZAHRA Hope for chart documentation. 9/12/2024  11:08 EDT

## 2024-09-18 ENCOUNTER — OFFICE VISIT (OUTPATIENT)
Dept: ORTHOPEDIC SURGERY | Facility: CLINIC | Age: 69
End: 2024-09-18
Payer: MEDICARE

## 2024-09-18 VITALS — WEIGHT: 140.3 LBS | BODY MASS INDEX: 25.82 KG/M2 | TEMPERATURE: 98.7 F | HEIGHT: 62 IN

## 2024-09-18 DIAGNOSIS — G89.29 CHRONIC PAIN OF LEFT KNEE: ICD-10-CM

## 2024-09-18 DIAGNOSIS — M25.562 CHRONIC PAIN OF LEFT KNEE: ICD-10-CM

## 2024-09-18 DIAGNOSIS — M25.562 LEFT KNEE PAIN, UNSPECIFIED CHRONICITY: Primary | ICD-10-CM

## 2024-09-18 PROCEDURE — 1160F RVW MEDS BY RX/DR IN RCRD: CPT | Performed by: ORTHOPAEDIC SURGERY

## 2024-09-18 PROCEDURE — 99203 OFFICE O/P NEW LOW 30 MIN: CPT | Performed by: ORTHOPAEDIC SURGERY

## 2024-09-18 PROCEDURE — 73562 X-RAY EXAM OF KNEE 3: CPT | Performed by: ORTHOPAEDIC SURGERY

## 2024-09-18 PROCEDURE — 1159F MED LIST DOCD IN RCRD: CPT | Performed by: ORTHOPAEDIC SURGERY

## 2024-09-20 ENCOUNTER — HOSPITAL ENCOUNTER (OUTPATIENT)
Dept: MRI IMAGING | Facility: HOSPITAL | Age: 69
Discharge: HOME | End: 2024-09-20
Payer: MEDICARE

## 2024-09-20 DIAGNOSIS — M25.562 LEFT KNEE PAIN, UNSPECIFIED CHRONICITY: ICD-10-CM

## 2024-09-20 PROCEDURE — 73721 MRI JNT OF LWR EXTRE W/O DYE: CPT

## 2024-09-23 ENCOUNTER — TELEPHONE (OUTPATIENT)
Dept: ORTHOPEDIC SURGERY | Facility: CLINIC | Age: 69
End: 2024-09-23

## 2024-09-23 NOTE — TELEPHONE ENCOUNTER
Caller: Connie Posey    Relationship: Self    Best call back number:     Caller requesting test results: PATIENT     What test was performed: MRI    When was the test performed: 9/20/24    Where was the test performed:     Additional notes: IT CAME UP AS SPAM SO SHE COULDN'T ANSWER

## 2024-09-24 NOTE — TELEPHONE ENCOUNTER
Caller: Connie Posey    Relationship: Self    Best call back number: [phone]    What is the best time to reach you: ANY    Who are you requesting to speak with (clinical staff, provider,  specific staff member): CLINICAL    What was the call regarding: SHE IS VERY ANXIOUS REGARDING MRI RESULTS- PLEASE CALL-

## 2024-09-25 ENCOUNTER — TELEPHONE (OUTPATIENT)
Dept: ORTHOPEDIC SURGERY | Facility: CLINIC | Age: 69
End: 2024-09-25
Payer: MEDICARE

## 2024-09-25 NOTE — TELEPHONE ENCOUNTER
Caller: Connie Posey    Relationship: Self    Best call back number: [phone]     What is the best time to reach you: ANYTIME    Who are you requesting to speak with (clinical staff, provider,  specific staff member): CLINICAL    What was the call regarding: LT KNEE INJECTION- PT STATES THAT SHE WAS SUPPOSED TO  GET A CALL TODAY. PLEASE HEAR ASAP    Is it okay if the provider responds through MyChart: PREFER TO TALK     TRIED TO WT- NO ANSWER

## 2024-09-30 ENCOUNTER — OFFICE VISIT (OUTPATIENT)
Dept: ORTHOPEDIC SURGERY | Facility: CLINIC | Age: 69
End: 2024-09-30
Payer: MEDICARE

## 2024-09-30 VITALS — HEIGHT: 62 IN | WEIGHT: 144.4 LBS | TEMPERATURE: 98.6 F | BODY MASS INDEX: 26.57 KG/M2

## 2024-09-30 DIAGNOSIS — G89.29 CHRONIC PAIN OF LEFT KNEE: Primary | ICD-10-CM

## 2024-09-30 DIAGNOSIS — M23.201 OLD TEAR OF LATERAL MENISCUS OF LEFT KNEE, UNSPECIFIED TEAR TYPE: ICD-10-CM

## 2024-09-30 DIAGNOSIS — M17.12 PATELLOFEMORAL ARTHRITIS OF LEFT KNEE: ICD-10-CM

## 2024-09-30 DIAGNOSIS — M23.204 OLD TEAR OF MEDIAL MENISCUS OF LEFT KNEE, UNSPECIFIED TEAR TYPE: ICD-10-CM

## 2024-09-30 DIAGNOSIS — M25.562 CHRONIC PAIN OF LEFT KNEE: Primary | ICD-10-CM

## 2024-09-30 PROCEDURE — 1160F RVW MEDS BY RX/DR IN RCRD: CPT | Performed by: NURSE PRACTITIONER

## 2024-09-30 PROCEDURE — 20610 DRAIN/INJ JOINT/BURSA W/O US: CPT | Performed by: NURSE PRACTITIONER

## 2024-09-30 PROCEDURE — 1159F MED LIST DOCD IN RCRD: CPT | Performed by: NURSE PRACTITIONER

## 2024-09-30 RX ORDER — LIDOCAINE HYDROCHLORIDE 10 MG/ML
2 INJECTION, SOLUTION EPIDURAL; INFILTRATION; INTRACAUDAL; PERINEURAL
Status: COMPLETED | OUTPATIENT
Start: 2024-09-30 | End: 2024-09-30

## 2024-09-30 RX ORDER — METHYLPREDNISOLONE ACETATE 80 MG/ML
80 INJECTION, SUSPENSION INTRA-ARTICULAR; INTRALESIONAL; INTRAMUSCULAR; SOFT TISSUE
Status: COMPLETED | OUTPATIENT
Start: 2024-09-30 | End: 2024-09-30

## 2024-09-30 RX ORDER — FLUTICASONE PROPIONATE 50 UG/1
SPRAY, METERED NASAL
COMMUNITY

## 2024-09-30 RX ADMIN — METHYLPREDNISOLONE ACETATE 80 MG: 80 INJECTION, SUSPENSION INTRA-ARTICULAR; INTRALESIONAL; INTRAMUSCULAR; SOFT TISSUE at 08:03

## 2024-09-30 RX ADMIN — LIDOCAINE HYDROCHLORIDE 2 ML: 10 INJECTION, SOLUTION EPIDURAL; INFILTRATION; INTRACAUDAL; PERINEURAL at 08:03

## 2024-09-30 NOTE — PROGRESS NOTES
Ms. Posey comes in today for a left knee cortisone injection as recommended by Dr. Miller.    The risk, benefits, and alternatives to the injection were discussed.  She verbalized understanding and consented to proceed.  The injection was performed as noted below.  Going forward, she may follow-up as needed.      Large Joint Arthrocentesis: L knee  Date/Time: 9/30/2024 8:03 AM  Consent given by: patient  Site marked: site marked  Timeout: Immediately prior to procedure a time out was called to verify the correct patient, procedure, equipment, support staff and site/side marked as required   Supporting Documentation  Indications: pain   Procedure Details  Location: knee - L knee  Preparation: Patient was prepped and draped in the usual sterile fashion  Needle gauge: 21 G.  Approach: anterolateral  Medications administered: 2 mL lidocaine PF 1% 1 %; 80 mg methylPREDNISolone acetate 80 MG/ML  Patient tolerance: patient tolerated the procedure well with no immediate complications      Deborah Mandujano, ZAHRA    09/30/2024

## 2025-01-08 ENCOUNTER — OFFICE VISIT (OUTPATIENT)
Dept: INTERNAL MEDICINE | Facility: CLINIC | Age: 70
End: 2025-01-08
Payer: MEDICARE

## 2025-01-08 ENCOUNTER — TELEPHONE (OUTPATIENT)
Dept: INTERNAL MEDICINE | Facility: CLINIC | Age: 70
End: 2025-01-08

## 2025-01-08 VITALS
RESPIRATION RATE: 18 BRPM | HEART RATE: 89 BPM | TEMPERATURE: 97.5 F | OXYGEN SATURATION: 98 % | BODY MASS INDEX: 27.75 KG/M2 | WEIGHT: 147 LBS | HEIGHT: 61 IN | SYSTOLIC BLOOD PRESSURE: 122 MMHG | DIASTOLIC BLOOD PRESSURE: 80 MMHG

## 2025-01-08 DIAGNOSIS — M25.572 ACUTE LEFT ANKLE PAIN: ICD-10-CM

## 2025-01-08 DIAGNOSIS — M79.672 LEFT FOOT PAIN: Primary | ICD-10-CM

## 2025-01-08 PROCEDURE — 99213 OFFICE O/P EST LOW 20 MIN: CPT | Performed by: NURSE PRACTITIONER

## 2025-01-08 PROCEDURE — 1159F MED LIST DOCD IN RCRD: CPT | Performed by: NURSE PRACTITIONER

## 2025-01-08 PROCEDURE — 1125F AMNT PAIN NOTED PAIN PRSNT: CPT | Performed by: NURSE PRACTITIONER

## 2025-01-08 PROCEDURE — 1160F RVW MEDS BY RX/DR IN RCRD: CPT | Performed by: NURSE PRACTITIONER

## 2025-01-08 PROCEDURE — 3074F SYST BP LT 130 MM HG: CPT | Performed by: NURSE PRACTITIONER

## 2025-01-08 PROCEDURE — 3079F DIAST BP 80-89 MM HG: CPT | Performed by: NURSE PRACTITIONER

## 2025-01-08 NOTE — PROGRESS NOTES
"Chief Complaint  left leg (Knot pet stairs fell on it 2 months) and cat scratch (Left leg same area)    Subjective        Connie Posey presents to [facility]  History of Present Illness  History of Present Illness  The patient presents for evaluation of left foot pain.    She reports a persistent knot in her left foot, which she attributes to a cat scratch that occurred between the end of October 2024 and the beginning of November 2024. The area remains swollen and bruised, with no significant improvement over time. She describes the sensation as tight and crampy, rather than numb. The pain is particularly noticeable when she is lying down, similar to her experience with arthritis. She has not sought care from a podiatrist for this issue. She also mentions a previous foot sprain, for which she sought urgent care and underwent an x-ray examination.     She has been monitoring her foot condition by checking for sensation with her toenail. She experiences intermittent itching in her toes, which she believes is related to her arthritis. She recalls a recent incident where her entire foot became swollen due to an injury related to a cat, which resolved following antibiotic treatment. She is not currently taking any pain medication or applying ice to the affected area. The pain tends to occur during sleep, often waking her up due to its sharpness.    Supplemental Information  She had seen Dr. Miller for left knee pain. She had an effusion of the knee. She chose the steroid injection. She did an MRI as well. She had a flap tear. She did not think it was worth going into surgery. She had 2 different surgeries that they could have done. She opted for the injection, which only burned a couple of seconds and it took about 2 to 3 weeks before it was gradually getting better.       Objective   Vital Signs:  /80   Pulse 89   Temp 97.5 °F (36.4 °C)   Resp 18   Ht 156.2 cm (61.5\")   Wt " "66.7 kg (147 lb)   SpO2 98%   BMI 27.33 kg/m²   Estimated body mass index is 27.33 kg/m² as calculated from the following:    Height as of this encounter: 156.2 cm (61.5\").    Weight as of this encounter: 66.7 kg (147 lb).       Physical Exam  Vitals and nursing note reviewed.   Constitutional:       Appearance: Normal appearance.   HENT:      Head: Normocephalic.      Nose: Nose normal.      Mouth/Throat:      Mouth: Mucous membranes are moist.   Eyes:      Pupils: Pupils are equal, round, and reactive to light.   Cardiovascular:      Rate and Rhythm: Normal rate and regular rhythm.      Pulses: Normal pulses.      Heart sounds: Normal heart sounds.      Comments: No peripheral edema noted  Pulmonary:      Effort: Pulmonary effort is normal. No respiratory distress.      Breath sounds: Normal breath sounds. No stridor. No wheezing, rhonchi or rales.      Comments: Denies SOB  Chest:      Chest wall: No tenderness.   Musculoskeletal:         General: Swelling and tenderness present. No signs of injury.      Comments: Left medial ankle pain with swelling x few months    Skin:     General: Skin is warm.      Capillary Refill: Capillary refill takes less than 2 seconds.      Findings: No bruising or erythema.   Neurological:      Mental Status: She is alert and oriented to person, place, and time.   Psychiatric:         Behavior: Behavior normal.        Physical Exam  There is swelling in the medial ankle of the left foot. Pedal pulses are good.    Vital Signs  Blood pressure and pulse are normal.     Result Review :          Results                Assessment and Plan     Diagnoses and all orders for this visit:    1. Left foot pain (Primary)  -     Ambulatory Referral to Podiatry    2. Acute left ankle pain  -     Ambulatory Referral to Podiatry      Assessment & Plan  1. Left foot pain.  The patient reports persistent pain and swelling in the left foot, which began after a cat scratch in late October or early " November. The pain is described as sharp and crampy, particularly at night. There is a firm knot near the medical ankle, and the swelling has not improved significantly. The patient has a history of a previous foot sprain but no recent injuries. Given the good pedal pulses and the absence of recent trauma, a referral to a podiatrist is appropriate for further evaluation. A referral to a podiatrist will be made for further evaluation and management.         I spent 25 minutes caring for Connie on this date of service. This time includes time spent by me in the following activities:preparing for the visit, reviewing tests, obtaining and/or reviewing a separately obtained history, performing a medically appropriate examination and/or evaluation , counseling and educating the patient/family/caregiver, ordering medications, tests, or procedures, referring and communicating with other health care professionals , documenting information in the medical record, independently interpreting results and communicating that information with the patient/family/caregiver, and care coordination  Follow Up     Return if symptoms worsen or fail to improve, for Recheck.  Patient was given instructions and counseling regarding her condition or for health maintenance advice. Please see specific information pulled into the AVS if appropriate.     Patient or patient representative verbalized consent for the use of Ambient Listening during the visit with  ZAHRA Hope for chart documentation. 1/8/2025  13:50 EST

## 2025-01-08 NOTE — TELEPHONE ENCOUNTER
Caller: Connie Posey    Relationship: Self    Best call back number: [phone]    What is the best time to reach you: ANYTIME     Who are you requesting to speak with (clinical staff, provider,  specific staff member): CLINICAL     What was the call regarding: PATIENT STATES THAT THE CLOSEST [facility]ODI[facility] IS IN [place] AND SHE WOULD LIKE SOMETHING CLOSER.     PLEASE ADVISE

## 2025-02-07 ENCOUNTER — TELEPHONE (OUTPATIENT)
Dept: INTERNAL MEDICINE | Facility: CLINIC | Age: 70
End: 2025-02-07

## 2025-02-07 NOTE — TELEPHONE ENCOUNTER
Caller: Connie Posey    Relationship to patient: Self    Best call back number:      Patient is needing: PATIENT STATES SHE DOES NOT WANT TO COME INTO THE OFFICE DUE TO ALL THE FLU CASES.  PATIENT STATES SHE KNOWS SHE IS GETTING BRONCHITIS AND SHE WOULD LIKE TO KNOW IF SOMETHING CAN BE CALLED IN TO TREAT HER BRONCHITIS AND THEN IF SHE IS NOT BETTER NEXT WEEK, SHE WILL COME IN FOR AN APPT.   SHE STATES SHE DID BLOW OUT GREEN.     PLEASE NOTIFY THE PATIENT IF SOMETHING WILL BE CALLED IN.

## 2025-02-09 DIAGNOSIS — M19.042 PRIMARY OSTEOARTHRITIS OF BOTH HANDS: Chronic | ICD-10-CM

## 2025-02-09 DIAGNOSIS — M19.041 PRIMARY OSTEOARTHRITIS OF BOTH HANDS: Chronic | ICD-10-CM

## 2025-02-09 DIAGNOSIS — E78.2 MIXED HYPERLIPIDEMIA: Chronic | ICD-10-CM

## 2025-02-09 DIAGNOSIS — I10 BENIGN ESSENTIAL HYPERTENSION: Chronic | ICD-10-CM

## 2025-02-10 RX ORDER — HYDROCODONE BITARTRATE AND ACETAMINOPHEN 5; 325 MG/1; MG/1
TABLET ORAL
Qty: 90 TABLET | Refills: 0 | Status: SHIPPED | OUTPATIENT
Start: 2025-02-10

## 2025-02-10 RX ORDER — TRIAMTERENE AND HYDROCHLOROTHIAZIDE 37.5; 25 MG/1; MG/1
CAPSULE ORAL
Qty: 90 CAPSULE | Refills: 1 | Status: SHIPPED | OUTPATIENT
Start: 2025-02-10

## 2025-02-10 RX ORDER — ROSUVASTATIN CALCIUM 10 MG/1
TABLET, COATED ORAL
Qty: 90 TABLET | Refills: 3 | Status: SHIPPED | OUTPATIENT
Start: 2025-02-10

## 2025-04-15 ENCOUNTER — OFFICE VISIT (OUTPATIENT)
Dept: INTERNAL MEDICINE | Facility: CLINIC | Age: 70
End: 2025-04-15
Payer: MEDICARE

## 2025-04-15 VITALS
WEIGHT: 151 LBS | DIASTOLIC BLOOD PRESSURE: 72 MMHG | TEMPERATURE: 98.1 F | SYSTOLIC BLOOD PRESSURE: 126 MMHG | HEART RATE: 94 BPM | OXYGEN SATURATION: 95 % | BODY MASS INDEX: 28.51 KG/M2 | RESPIRATION RATE: 16 BRPM | HEIGHT: 61 IN

## 2025-04-15 DIAGNOSIS — M19.042 PRIMARY OSTEOARTHRITIS OF BOTH HANDS: Chronic | ICD-10-CM

## 2025-04-15 DIAGNOSIS — G89.29 CHRONIC PAIN OF LEFT KNEE: Primary | ICD-10-CM

## 2025-04-15 DIAGNOSIS — M25.562 CHRONIC PAIN OF LEFT KNEE: Primary | ICD-10-CM

## 2025-04-15 DIAGNOSIS — M17.12 PRIMARY OSTEOARTHRITIS OF LEFT KNEE: ICD-10-CM

## 2025-04-15 DIAGNOSIS — S83.242D ACUTE MEDIAL MENISCUS TEAR, LEFT, SUBSEQUENT ENCOUNTER: ICD-10-CM

## 2025-04-15 DIAGNOSIS — M19.041 PRIMARY OSTEOARTHRITIS OF BOTH HANDS: Chronic | ICD-10-CM

## 2025-04-15 RX ORDER — HYDROCODONE BITARTRATE AND ACETAMINOPHEN 5; 325 MG/1; MG/1
TABLET ORAL
Qty: 90 TABLET | Refills: 0 | Status: SHIPPED | OUTPATIENT
Start: 2025-04-15

## 2025-04-15 RX ORDER — PREDNISONE 10 MG/1
TABLET ORAL
Qty: 46 TABLET | Refills: 0 | Status: SHIPPED | OUTPATIENT
Start: 2025-04-15

## 2025-04-15 NOTE — PROGRESS NOTES
04/15/2025    Patient Information  Connie Posey                                                                                          [address] [facility] [address]      1955  [unfilled]  There is no work phone number on file.    Chief Complaint:     Left knee pain.    History of Present Illness:    Patient reports that she did a lot of ambulation here recently.  She has now developed worsening pain in her left knee.  She has been evaluated by orthopedist regarding this and was diagnosed with osteoarthritis.  She also indicates she was diagnosed with sciatica but this is not the type of symptoms she is having.  No recent injury.  She has some compound pain/anti-inflammatory medication cream from the podiatrist and has been using that which helps some.  She did have a recent MRI of the knee and this revealed a small horizontal flap tear posterior aspect of the medial meniscus body with meniscal flap extending below the anterior aspect of the outer third of the meniscal body.  Small free edge radial tear of the lateral meniscus body.  Mild medial and mild to moderate patellofemoral compartment articular cartilage thinning.  Joint effusion.  Ganglion cyst adjacent to the proximal tibiofibular joint and popliteus tendon sheath extends partially of the peroneus longus muscle and partially in the fibular head.    Review of Systems   Constitutional: Negative. Negative for chills, diaphoresis and fever.   HENT: Negative.  Negative for congestion and sore throat.    Eyes: Negative.    Cardiovascular: Negative.  Negative for chest pain.   Respiratory: Negative.  Negative for cough.    Endocrine: Negative.    Hematologic/Lymphatic: Negative.    Skin: Negative.  Negative for rash.   Musculoskeletal: Negative.  Positive for arthritis, joint pain and myalgias. Negative for neck pain.   Gastrointestinal: Negative.  Negative for abdominal pain, anorexia, nausea and vomiting.   Genitourinary:  Negative.  Negative for dysuria.   Neurological: Negative.  Negative for headaches, numbness, vertigo and weakness.   Psychiatric/Behavioral: Negative.     Allergic/Immunologic: Negative.        Active Problems:    Patient Active Problem List   Diagnosis    Allergic rhinitis    Chronic midline low back pain with left-sided sciatica    Dermatochalasis of both eyelids    Benign essential hypertension    Microscopic hematuria    Chronic migraine w/o aura w/o status migrainosus, not intractable    Hyperlipidemia    Primary osteoarthritis of both hands    Seborrheic dermatitis    PVD (posterior vitreous detachment), bilateral    Nuclear sclerotic cataract of both eyes    Multiple environmental allergies    Vitamin D deficiency    Therapeutic drug monitoring    Vitamin B12 deficiency    History of colon polyps, 7/1/2022--hyperplastic x1.    History of kidney stones    Osteopenia of multiple sites    Postmenopausal state    Bilateral sensorineural hearing loss    Hypokalemia    Chronic pain of left knee    Primary osteoarthritis of left knee    Acute medial meniscus tear, left, subsequent encounter         Past Medical History:   Diagnosis Date    Allergic rhinitis 12/17/2010    Benign essential hypertension 12/17/2010    Formatting of this note might be different from the original. Hypertension Formatting of this note might be different from the original. Hypertension    Bilateral sensorineural hearing loss 12/21/2022    Chronic midline low back pain with left-sided sciatica 11/21/2013 October 4, 2022--patient presents with approximately 2-month history of intermittent episodes of pain in her left lower extremity that oftentimes begins at the buttock and shoots down the leg posterior laterally to the level of the mid and sometimes distal calf.  This is worse with certain movements as well as prolonged standing.  If she sits down and drives it tends to get worse.  She occasionall    Chronic midline low back pain without  sciatica 11/21/2013    Chronic migraine w/o aura w/o status migrainosus, not intractable 09/16/2011    Dermatochalasis of both eyelids 07/17/2018    History of colon polyps, 7/1/2022--hyperplastic x1. 04/06/2022 July 1, 2022--colonoscopy revealed hyperplastic polyp in the sigmoid colon x1.    History of kidney stones 05/05/2022    Hyperlipidemia 12/23/2010    Microscopic hematuria 02/11/2011    Nuclear sclerotic cataract of both eyes 03/06/2019    Osteopenia of multiple sites 10/04/2022    Postmenopausal state 10/04/2022    Primary osteoarthritis of both hands 08/13/2010    Formatting of this note might be different from the original. Osteoarthritis Of The Hand - Bilateral  10/1 IMO update Formatting of this note might be different from the original. Osteoarthritis Of The Hand - Bilateral  10/1 IMO update    PVD (posterior vitreous detachment), bilateral 07/17/2018    Seborrheic dermatitis 12/17/2010    Formatting of this note might be different from the original. Seborrheic Dermatitis  10/1 IMO update Formatting of this note might be different from the original. Seborrheic Dermatitis  10/1 IMO update    Vitamin B12 deficiency 04/06/2022    Vitamin D deficiency 04/06/2022         Past Surgical History:   Procedure Laterality Date    BELPHAROPTOSIS REPAIR      CHOLECYSTECTOMY N/A     COLONOSCOPY N/A 07/01/2022 July 1, 2022--colonoscopy revealed hyperplastic polyp in the sigmoid colon x1.    COLONOSCOPY W/ BIOPSIES N/A 03/17/2017 March 17, 2017--colonoscopy    COSMETIC SURGERY      EYE SURGERY      HERNIA REPAIR      INGUINAL HERNIA REPAIR      SUBTOTAL HYSTERECTOMY      TONSILLECTOMY      UMBILICAL HERNIA REPAIR      VAGINAL HYSTERECTOMY N/A     Age 30--partial vaginal hysterectomy.  Ovaries intact.         Allergies   Allergen Reactions    Sulfa Antibiotics Unknown - High Severity      Confusion--makes her feel weird    Codeine Confusion     Makes her feel weird and dizzy        Hydrocodone Itching     Only  with certain brands or if taken too close together    Nitrofurantoin Nausea Only     GI upset    Propoxyphene Rash      Confusion--makes her feel weird  made me feel weird    Tramadol Palpitations     her twin has this reaction to tramadol. She has not had a reaction              Current Outpatient Medications:     azelastine (ASTELIN) 0.1 % nasal spray, Administer 2 sprays into the nostril(s) as directed by provider 2 (Two) Times a Day. Use in each nostril as directed, Disp: , Rfl:     fluticasone (FLONASE) 50 MCG/ACT nasal spray, , Disp: , Rfl:     HYDROcodone-acetaminophen (NORCO) 5-325 MG per tablet, Take 1 p.o. 3 times daily as needed arthritic pain., Disp: 90 tablet, Rfl: 0    ketoconazole (NIZORAL) 2 % shampoo, LATHER ON TO SCALP AND FACE AND LET SIT 3-5 MINUTES PRIOR TO RINSING. USE 2-3 TIMES PER WEEK, Disp: , Rfl:     Multiple Vitamins-Minerals (Eye Vitamins) capsule, Take  by mouth Daily., Disp: , Rfl:     PROBIOTIC PRODUCT PO, Take  by mouth., Disp: , Rfl:     rizatriptan MLT (MAXALT-MLT) 10 MG disintegrating tablet, May repeat in 2 hours if needed, Disp: 9 tablet, Rfl: 0    rosuvastatin (Crestor) 10 MG tablet, Take 1 p.o. daily for high cholesterol, Disp: 90 tablet, Rfl: 3    triamterene-hydrochlorothiazide (DYAZIDE) 37.5-25 MG per capsule, TAKE 1 CAPSULE BY MOUTH EVERY MORNING FOR HIGH BLOOD PRESSURE, Disp: 90 capsule, Rfl: 1    predniSONE (DELTASONE) 10 MG tablet, 5 by mouth daily 5 days, then 4 daily 2 days, 3 daily 2 days, 2 daily 2 days, 1 daily 2 days, one half daily 2 days and then discontinue., Disp: 46 tablet, Rfl: 0      Family History   Problem Relation Age of Onset    Hypertension Mother     COPD Mother     Heart disease Mother     Depression Mother     Thyroid disease Mother     Anxiety disorder Mother     Heart disease Father     Alcohol abuse Father     Kidney disease Father     Liver disease Father     Diabetes Maternal Grandmother     Arthritis Maternal Grandmother     Stroke Maternal  "Grandmother     Diabetes Maternal Grandfather     Birth defects Son     Diabetes Paternal Grandfather     Diabetes Paternal Grandmother     Rheumatologic disease Sister     Early death Son     [place] Hyperthermia Neg Hx          Social History     Socioeconomic History    Marital status: Single   Tobacco Use    Smoking status: Former     Current packs/day: 0.00     Average packs/day: 1.5 packs/day for 13.0 years (19.5 ttl pk-yrs)     Types: Cigarettes     Start date: 1972     Quit date: 1985     Years since quittin.3     Passive exposure: Past    Smokeless tobacco: Never   Vaping Use    Vaping status: Never Used   Substance and Sexual Activity    Alcohol use: Not Currently     Comment: Occasionally    Drug use: Never    Sexual activity: Defer         Vitals:    04/15/25 1204   BP: 126/72   Pulse: 94   Resp: 16   Temp: 98.1 °F (36.7 °C)   TempSrc: Oral   SpO2: 95%   Weight: 68.5 kg (151 lb)   Height: 156.2 cm (61.5\")        Body mass index is 28.07 kg/m².      Physical Exam:    General: Alert and oriented x 3.  No acute distress.  Normal affect.  HEENT: Pupils equal, round, reactive to light; extraocular movements intact; sclerae nonicteric; pharynx, ear canals and TMs normal.  Neck: Without JVD, thyromegaly, bruit, or adenopathy.  Lungs: Clear to auscultation in all fields.  Heart: Regular rate and rhythm without murmur, rub, gallop, or click.  Abdomen: Soft, nontender, without hepatosplenomegaly or hernia.  Bowel sounds normal.  : Deferred.  Rectal: Deferred.  Extremities: Without clubbing, cyanosis, edema, or pulse deficit.  Neurologic: Intact without focal deficit.  Normal station and gait observed during ingress and egress from the examination room.  Skin: Without significant lesion.  Musculoskeletal: I do not see any significant swelling of the left knee.  Range of motion seems good.  No crepitance.  No definite instability.      Lab/other results:      Assessment/Plan:     Diagnosis Plan   1. " Chronic pain of left knee  predniSONE (DELTASONE) 10 MG tablet      2. Primary osteoarthritis of left knee  predniSONE (DELTASONE) 10 MG tablet      3. Acute medial meniscus tear, left, subsequent encounter  predniSONE (DELTASONE) 10 MG tablet      4. Primary osteoarthritis of both hands  HYDROcodone-acetaminophen (NORCO) 5-325 MG per tablet        Patient with continued chronic pain in the left knee it has been evaluated by orthopedist and workup revealed arthritis and a torn meniscus.  Patient recently overdid it and this worsened the pain.  Options would be continue with the topical treatment given by the podiatrist, nonsteroidal anti-inflammatory medication or prednisone.    Plan is as follows: Prednisone 50 mg p.o. daily x 5 days, taper and discontinue.  Patient should contact me for any significant change in symptoms.  We will set her up for her subsequent Medicare wellness visit on or after August 19, 2025.  I would like for her to hold off on any exercise or strenuous activity until the prednisone has toned down the pain and inflammation.        Procedures

## 2025-05-06 DIAGNOSIS — E78.2 MIXED HYPERLIPIDEMIA: Chronic | ICD-10-CM

## 2025-05-06 NOTE — TELEPHONE ENCOUNTER
Caller: Connie Posey    Relationship: Self    Best call back number: [phone]    Requested Prescriptions:   Requested Prescriptions     Pending Prescriptions Disp Refills    rosuvastatin (Crestor) 10 MG tablet 90 tablet 3     Sig: Take 1 p.o. daily for high cholesterol        Pharmacy where request should be sent: [facility] #[id number] - [place], [place] - [address] [facility] [phone] [facility] [phone]      Last office visit with prescribing clinician: 4/15/2025   Last telemedicine visit with prescribing clinician: Visit date not found   Next office visit with prescribing clinician: 8/21/2025     Additional details provided by patient: STATED THAT THE PHARMACY INFORMED THEM THEY ARE HAVING AN ISSUE WITH PROCESSING THE ADDITIONAL REFILLS THAT WERE ON THE MEDICATION SO THEY ARE NEEDING TO GET THIS SENT IN SEPARATELY. STATED THAT THEY HAVE ABOUT 10 DAYS OF THE MEDICATION REMAINING    Does the patient have less than a 3 day supply:  [] Yes  [x] No    Would you like a call back once the refill request has been completed: [] Yes [x] No    If the office needs to give you a call back, can they leave a voicemail: [] Yes [x] No    Willis Romero Rep   05/06/25 10:26 EDT

## 2025-05-07 RX ORDER — ROSUVASTATIN CALCIUM 10 MG/1
TABLET, COATED ORAL
Qty: 90 TABLET | Refills: 3 | Status: SHIPPED | OUTPATIENT
Start: 2025-05-07

## 2025-05-14 ENCOUNTER — TELEPHONE (OUTPATIENT)
Dept: INTERNAL MEDICINE | Facility: CLINIC | Age: 70
End: 2025-05-14

## 2025-05-14 NOTE — TELEPHONE ENCOUNTER
DELETE AFTER REVIEWING: Send this encounter to the appropriate pool. See your Call Action Grid or Workflows for direction.    Caller: Connie Posey    Relationship: Self    Best call back number:     [phone] (Mobile)       What is the medical concern/diagnosis: KNEE PAIN     What specialty or service is being requested: ORTHO     What is the provider, practice or medical service name: [facility], STATES SHE WAS SENT THERE BEFORE FOR HER ANKLE     What is the office phone number: [phone]    Any additional details: PATIENT STATES THEY NEED A NEW ORDER FOR PT

## 2025-05-16 ENCOUNTER — TELEPHONE (OUTPATIENT)
Dept: INTERNAL MEDICINE | Facility: CLINIC | Age: 70
End: 2025-05-16
Payer: MEDICARE

## 2025-05-16 DIAGNOSIS — S83.242D ACUTE MEDIAL MENISCUS TEAR, LEFT, SUBSEQUENT ENCOUNTER: ICD-10-CM

## 2025-05-16 DIAGNOSIS — G89.29 CHRONIC PAIN OF LEFT ANKLE: ICD-10-CM

## 2025-05-16 DIAGNOSIS — G89.29 CHRONIC PAIN OF LEFT KNEE: Primary | ICD-10-CM

## 2025-05-16 DIAGNOSIS — M17.12 PRIMARY OSTEOARTHRITIS OF LEFT KNEE: ICD-10-CM

## 2025-05-16 DIAGNOSIS — M25.572 CHRONIC PAIN OF LEFT ANKLE: ICD-10-CM

## 2025-05-16 DIAGNOSIS — M25.562 CHRONIC PAIN OF LEFT KNEE: Primary | ICD-10-CM

## 2025-05-16 NOTE — TELEPHONE ENCOUNTER
Dr. Guadarrama, Would you Please put in a referral for [facility] [phone]  For her Chronic Pain of L knee / ankle

## 2025-05-20 ENCOUNTER — TELEPHONE (OUTPATIENT)
Dept: INTERNAL MEDICINE | Facility: CLINIC | Age: 70
End: 2025-05-20

## 2025-05-20 NOTE — TELEPHONE ENCOUNTER
Caller: TatyConnie    Relationship: Self    Best call back number: [phone]*    What was the call regarding: PATIENT REQUEST REFERRAL FOR [facility] For her Chronic Pain of L knee / ankle, TO BE FAXED SO THE PATIENT CAN BE SCHEDULED.  FAX#: [phone].  THE PATIENT REQUEST A COURTESY CALL TO LET HER KNOW WHEN THE REFERRAL HAS BEEN FAXED.

## 2025-06-03 ENCOUNTER — TELEPHONE (OUTPATIENT)
Dept: INTERNAL MEDICINE | Facility: CLINIC | Age: 70
End: 2025-06-03
Payer: MEDICARE

## 2025-06-03 NOTE — TELEPHONE ENCOUNTER
Pt needs a referral put in  for Physical Therapy for [facility] sports medicine for left knee pain

## 2025-06-05 DIAGNOSIS — G89.29 CHRONIC PAIN OF LEFT KNEE: ICD-10-CM

## 2025-06-05 DIAGNOSIS — S83.242D ACUTE MEDIAL MENISCUS TEAR, LEFT, SUBSEQUENT ENCOUNTER: Primary | ICD-10-CM

## 2025-06-05 DIAGNOSIS — M25.562 CHRONIC PAIN OF LEFT KNEE: ICD-10-CM

## 2025-06-18 DIAGNOSIS — M19.042 PRIMARY OSTEOARTHRITIS OF BOTH HANDS: Chronic | ICD-10-CM

## 2025-06-18 DIAGNOSIS — M19.041 PRIMARY OSTEOARTHRITIS OF BOTH HANDS: Chronic | ICD-10-CM

## 2025-06-19 ENCOUNTER — TELEPHONE (OUTPATIENT)
Dept: INTERNAL MEDICINE | Facility: CLINIC | Age: 70
End: 2025-06-19
Payer: MEDICARE

## 2025-06-19 RX ORDER — HYDROCODONE BITARTRATE AND ACETAMINOPHEN 5; 325 MG/1; MG/1
TABLET ORAL
Qty: 90 TABLET | Refills: 0 | Status: SHIPPED | OUTPATIENT
Start: 2025-06-19

## 2025-06-23 ENCOUNTER — PRIOR AUTHORIZATION (OUTPATIENT)
Dept: INTERNAL MEDICINE | Facility: CLINIC | Age: 70
End: 2025-06-23
Payer: MEDICARE

## 2025-06-23 ENCOUNTER — TELEPHONE (OUTPATIENT)
Dept: INTERNAL MEDICINE | Facility: CLINIC | Age: 70
End: 2025-06-23
Payer: MEDICARE

## 2025-08-04 DIAGNOSIS — M19.042 PRIMARY OSTEOARTHRITIS OF BOTH HANDS: Chronic | ICD-10-CM

## 2025-08-04 DIAGNOSIS — M19.041 PRIMARY OSTEOARTHRITIS OF BOTH HANDS: Chronic | ICD-10-CM

## 2025-08-06 RX ORDER — HYDROCODONE BITARTRATE AND ACETAMINOPHEN 5; 325 MG/1; MG/1
TABLET ORAL
Qty: 90 TABLET | Refills: 0 | Status: SHIPPED | OUTPATIENT
Start: 2025-08-06

## 2025-08-07 ENCOUNTER — OFFICE VISIT (OUTPATIENT)
Dept: INTERNAL MEDICINE | Facility: CLINIC | Age: 70
End: 2025-08-07
Payer: MEDICARE

## 2025-08-07 VITALS
DIASTOLIC BLOOD PRESSURE: 84 MMHG | SYSTOLIC BLOOD PRESSURE: 140 MMHG | HEART RATE: 94 BPM | HEIGHT: 61 IN | TEMPERATURE: 98.4 F | BODY MASS INDEX: 28.47 KG/M2 | OXYGEN SATURATION: 96 % | WEIGHT: 150.8 LBS

## 2025-08-07 DIAGNOSIS — M85.89 OSTEOPENIA OF MULTIPLE SITES: Chronic | ICD-10-CM

## 2025-08-07 DIAGNOSIS — Z12.31 BREAST CANCER SCREENING BY MAMMOGRAM: ICD-10-CM

## 2025-08-07 DIAGNOSIS — E87.6 HYPOKALEMIA: Chronic | ICD-10-CM

## 2025-08-07 DIAGNOSIS — E53.8 VITAMIN B12 DEFICIENCY: Chronic | ICD-10-CM

## 2025-08-07 DIAGNOSIS — J20.9 ACUTE BRONCHITIS WITH BRONCHOSPASM: Primary | ICD-10-CM

## 2025-08-07 DIAGNOSIS — J30.1 NON-SEASONAL ALLERGIC RHINITIS DUE TO POLLEN: Chronic | ICD-10-CM

## 2025-08-07 DIAGNOSIS — R31.29 MICROSCOPIC HEMATURIA: Chronic | ICD-10-CM

## 2025-08-07 DIAGNOSIS — Z91.09 MULTIPLE ENVIRONMENTAL ALLERGIES: Chronic | ICD-10-CM

## 2025-08-07 DIAGNOSIS — Z87.442 HISTORY OF KIDNEY STONES: Chronic | ICD-10-CM

## 2025-08-07 DIAGNOSIS — E78.2 MIXED HYPERLIPIDEMIA: Chronic | ICD-10-CM

## 2025-08-07 DIAGNOSIS — Z78.0 POSTMENOPAUSAL STATE: Chronic | ICD-10-CM

## 2025-08-07 DIAGNOSIS — I10 BENIGN ESSENTIAL HYPERTENSION: Chronic | ICD-10-CM

## 2025-08-07 PROBLEM — S83.242D ACUTE MEDIAL MENISCUS TEAR, LEFT, SUBSEQUENT ENCOUNTER: Status: RESOLVED | Noted: 2025-04-15 | Resolved: 2025-08-07

## 2025-08-07 RX ORDER — PREDNISONE 10 MG/1
TABLET ORAL
Qty: 46 TABLET | Refills: 0 | Status: SHIPPED | OUTPATIENT
Start: 2025-08-07

## 2025-08-07 RX ORDER — CEFDINIR 300 MG/1
CAPSULE ORAL
Qty: 20 CAPSULE | Refills: 0 | Status: SHIPPED | OUTPATIENT
Start: 2025-08-07

## 2025-08-08 ENCOUNTER — TELEPHONE (OUTPATIENT)
Dept: INTERNAL MEDICINE | Facility: CLINIC | Age: 70
End: 2025-08-08
Payer: MEDICARE

## 2025-08-21 ENCOUNTER — LAB (OUTPATIENT)
Dept: LAB | Facility: HOSPITAL | Age: 70
End: 2025-08-21
Payer: MEDICARE

## 2025-08-21 ENCOUNTER — OFFICE VISIT (OUTPATIENT)
Dept: INTERNAL MEDICINE | Facility: CLINIC | Age: 70
End: 2025-08-21
Payer: MEDICARE

## 2025-08-21 VITALS
WEIGHT: 149 LBS | HEIGHT: 61 IN | BODY MASS INDEX: 28.13 KG/M2 | TEMPERATURE: 98.1 F | OXYGEN SATURATION: 96 % | RESPIRATION RATE: 16 BRPM | DIASTOLIC BLOOD PRESSURE: 80 MMHG | HEART RATE: 73 BPM | SYSTOLIC BLOOD PRESSURE: 126 MMHG

## 2025-08-21 DIAGNOSIS — Z78.0 POSTMENOPAUSAL STATE: Chronic | ICD-10-CM

## 2025-08-21 DIAGNOSIS — Z91.09 MULTIPLE ENVIRONMENTAL ALLERGIES: Chronic | ICD-10-CM

## 2025-08-21 DIAGNOSIS — H43.813 PVD (POSTERIOR VITREOUS DETACHMENT), BILATERAL: Chronic | ICD-10-CM

## 2025-08-21 DIAGNOSIS — E55.9 VITAMIN D DEFICIENCY: Chronic | ICD-10-CM

## 2025-08-21 DIAGNOSIS — M17.12 PRIMARY OSTEOARTHRITIS OF LEFT KNEE: Chronic | ICD-10-CM

## 2025-08-21 DIAGNOSIS — M85.89 OSTEOPENIA OF MULTIPLE SITES: Chronic | ICD-10-CM

## 2025-08-21 DIAGNOSIS — M19.042 PRIMARY OSTEOARTHRITIS OF BOTH HANDS: Chronic | ICD-10-CM

## 2025-08-21 DIAGNOSIS — M19.041 PRIMARY OSTEOARTHRITIS OF BOTH HANDS: Chronic | ICD-10-CM

## 2025-08-21 DIAGNOSIS — Z87.442 HISTORY OF KIDNEY STONES: ICD-10-CM

## 2025-08-21 DIAGNOSIS — H90.3 BILATERAL SENSORINEURAL HEARING LOSS: Chronic | ICD-10-CM

## 2025-08-21 DIAGNOSIS — I10 BENIGN ESSENTIAL HYPERTENSION: Chronic | ICD-10-CM

## 2025-08-21 DIAGNOSIS — H25.13 NUCLEAR SCLEROTIC CATARACT OF BOTH EYES: Chronic | ICD-10-CM

## 2025-08-21 DIAGNOSIS — E87.6 HYPOKALEMIA: Chronic | ICD-10-CM

## 2025-08-21 DIAGNOSIS — J30.1 NON-SEASONAL ALLERGIC RHINITIS DUE TO POLLEN: Chronic | ICD-10-CM

## 2025-08-21 DIAGNOSIS — R31.29 MICROSCOPIC HEMATURIA: ICD-10-CM

## 2025-08-21 DIAGNOSIS — Z00.00 ENCOUNTER FOR SUBSEQUENT ANNUAL WELLNESS VISIT (AWV) IN MEDICARE PATIENT: Primary | ICD-10-CM

## 2025-08-21 DIAGNOSIS — Z86.0100 HISTORY OF COLON POLYPS: Chronic | ICD-10-CM

## 2025-08-21 DIAGNOSIS — Z51.81 THERAPEUTIC DRUG MONITORING: ICD-10-CM

## 2025-08-21 DIAGNOSIS — E78.2 MIXED HYPERLIPIDEMIA: Chronic | ICD-10-CM

## 2025-08-21 DIAGNOSIS — G89.29 CHRONIC MIDLINE LOW BACK PAIN WITH LEFT-SIDED SCIATICA: Chronic | ICD-10-CM

## 2025-08-21 DIAGNOSIS — M54.42 CHRONIC MIDLINE LOW BACK PAIN WITH LEFT-SIDED SCIATICA: Chronic | ICD-10-CM

## 2025-08-21 DIAGNOSIS — L21.9 SEBORRHEIC DERMATITIS: Chronic | ICD-10-CM

## 2025-08-21 DIAGNOSIS — E53.8 VITAMIN B12 DEFICIENCY: Chronic | ICD-10-CM

## 2025-08-21 PROBLEM — J20.9 ACUTE BRONCHITIS WITH BRONCHOSPASM: Status: RESOLVED | Noted: 2025-08-07 | Resolved: 2025-08-21

## 2025-08-21 LAB
25(OH)D3 SERPL-MCNC: 31.1 NG/ML (ref 30–100)
ALBUMIN SERPL-MCNC: 4.4 G/DL (ref 3.5–5.2)
ALBUMIN/GLOB SERPL: 1.4 G/DL
ALP SERPL-CCNC: 58 U/L (ref 39–117)
ALT SERPL W P-5'-P-CCNC: 26 U/L (ref 1–33)
ANION GAP SERPL CALCULATED.3IONS-SCNC: 11.2 MMOL/L (ref 5–15)
AST SERPL-CCNC: 19 U/L (ref 1–32)
BACTERIA UR QL AUTO: NORMAL /HPF
BILIRUB SERPL-MCNC: 1.3 MG/DL (ref 0–1.2)
BILIRUB UR QL STRIP: NEGATIVE
BUN SERPL-MCNC: 26 MG/DL (ref 8–23)
BUN/CREAT SERPL: 24.1 (ref 7–25)
CALCIUM SPEC-SCNC: 9.7 MG/DL (ref 8.6–10.5)
CHLORIDE SERPL-SCNC: 97 MMOL/L (ref 98–107)
CK SERPL-CCNC: 57 U/L (ref 20–180)
CLARITY UR: ABNORMAL
CO2 SERPL-SCNC: 27.8 MMOL/L (ref 22–29)
COLOR UR: YELLOW
CREAT SERPL-MCNC: 1.08 MG/DL (ref 0.57–1)
DEPRECATED RDW RBC AUTO: 40.5 FL (ref 37–54)
EGFRCR SERPLBLD CKD-EPI 2021: 55 ML/MIN/1.73
ERYTHROCYTE [DISTWIDTH] IN BLOOD BY AUTOMATED COUNT: 13.1 % (ref 12.3–15.4)
GLOBULIN UR ELPH-MCNC: 3.2 GM/DL
GLUCOSE SERPL-MCNC: 104 MG/DL (ref 65–99)
GLUCOSE UR STRIP-MCNC: NEGATIVE MG/DL
HCT VFR BLD AUTO: 46.5 % (ref 34–46.6)
HCYS SERPL-MCNC: 15.1 UMOL/L (ref 0–15)
HGB BLD-MCNC: 15.1 G/DL (ref 12–15.9)
HGB UR QL STRIP.AUTO: ABNORMAL
HYALINE CASTS UR QL AUTO: NORMAL /LPF
KETONES UR QL STRIP: NEGATIVE
LEUKOCYTE ESTERASE UR QL STRIP.AUTO: ABNORMAL
MCH RBC QN AUTO: 27.8 PG (ref 26.6–33)
MCHC RBC AUTO-ENTMCNC: 32.5 G/DL (ref 31.5–35.7)
MCV RBC AUTO: 85.6 FL (ref 79–97)
NITRITE UR QL STRIP: NEGATIVE
PH UR STRIP.AUTO: 6.5 [PH] (ref 5–8)
PLATELET # BLD AUTO: 244 10*3/MM3 (ref 140–450)
PMV BLD AUTO: 8.9 FL (ref 6–12)
POTASSIUM SERPL-SCNC: 3.5 MMOL/L (ref 3.5–5.2)
PROT SERPL-MCNC: 7.6 G/DL (ref 6–8.5)
PROT UR QL STRIP: NEGATIVE
RBC # BLD AUTO: 5.43 10*6/MM3 (ref 3.77–5.28)
RBC # UR STRIP: NORMAL /HPF
REF LAB TEST METHOD: NORMAL
SODIUM SERPL-SCNC: 136 MMOL/L (ref 136–145)
SP GR UR STRIP: 1.02 (ref 1–1.03)
SQUAMOUS #/AREA URNS HPF: NORMAL /HPF
T3FREE SERPL-MCNC: 3.29 PG/ML (ref 2–4.4)
T4 FREE SERPL-MCNC: 1.72 NG/DL (ref 0.92–1.68)
TSH SERPL DL<=0.05 MIU/L-ACNC: 1.96 UIU/ML (ref 0.27–4.2)
UROBILINOGEN UR QL STRIP: ABNORMAL
WBC # UR STRIP: NORMAL /HPF
WBC NRBC COR # BLD AUTO: 8.94 10*3/MM3 (ref 3.4–10.8)

## 2025-08-21 PROCEDURE — 36415 COLL VENOUS BLD VENIPUNCTURE: CPT | Performed by: INTERNAL MEDICINE

## 2025-08-23 DIAGNOSIS — I10 BENIGN ESSENTIAL HYPERTENSION: Chronic | ICD-10-CM

## 2025-08-23 LAB
CHOLEST SERPL-MCNC: 173 MG/DL (ref 100–199)
HDL SERPL-SCNC: 35.9 UMOL/L
HDLC SERPL-MCNC: 56 MG/DL
LDL SERPL QN: 20.9 NM
LDL SERPL-SCNC: 1450 NMOL/L
LDL SMALL SERPL-SCNC: 668 NMOL/L
LDLC SERPL CALC-MCNC: 92 MG/DL (ref 0–99)
TRIGL SERPL-MCNC: 141 MG/DL (ref 0–149)

## 2025-08-25 RX ORDER — TRIAMTERENE AND HYDROCHLOROTHIAZIDE 37.5; 25 MG/1; MG/1
CAPSULE ORAL
Qty: 90 CAPSULE | Refills: 1 | Status: SHIPPED | OUTPATIENT
Start: 2025-08-25

## 2025-08-28 ENCOUNTER — LAB (OUTPATIENT)
Dept: LAB | Facility: HOSPITAL | Age: 70
End: 2025-08-28
Payer: MEDICARE

## 2025-08-28 ENCOUNTER — OFFICE VISIT (OUTPATIENT)
Dept: INTERNAL MEDICINE | Facility: CLINIC | Age: 70
End: 2025-08-28
Payer: MEDICARE

## 2025-08-28 VITALS
TEMPERATURE: 98.2 F | WEIGHT: 150 LBS | RESPIRATION RATE: 16 BRPM | OXYGEN SATURATION: 96 % | SYSTOLIC BLOOD PRESSURE: 130 MMHG | HEART RATE: 69 BPM | BODY MASS INDEX: 28.32 KG/M2 | DIASTOLIC BLOOD PRESSURE: 80 MMHG | HEIGHT: 61 IN

## 2025-08-28 DIAGNOSIS — I10 BENIGN ESSENTIAL HYPERTENSION: Chronic | ICD-10-CM

## 2025-08-28 DIAGNOSIS — E87.6 HYPOKALEMIA: Chronic | ICD-10-CM

## 2025-08-28 DIAGNOSIS — L21.9 SEBORRHEIC DERMATITIS: Chronic | ICD-10-CM

## 2025-08-28 DIAGNOSIS — Z51.81 THERAPEUTIC DRUG MONITORING: ICD-10-CM

## 2025-08-28 DIAGNOSIS — Z87.442 HISTORY OF KIDNEY STONES: Chronic | ICD-10-CM

## 2025-08-28 DIAGNOSIS — E55.9 VITAMIN D DEFICIENCY: Chronic | ICD-10-CM

## 2025-08-28 DIAGNOSIS — E53.8 VITAMIN B12 DEFICIENCY: Chronic | ICD-10-CM

## 2025-08-28 DIAGNOSIS — H43.813 PVD (POSTERIOR VITREOUS DETACHMENT), BILATERAL: Chronic | ICD-10-CM

## 2025-08-28 DIAGNOSIS — H90.3 BILATERAL SENSORINEURAL HEARING LOSS: Chronic | ICD-10-CM

## 2025-08-28 DIAGNOSIS — M15.0 PRIMARY OSTEOARTHRITIS INVOLVING MULTIPLE JOINTS: ICD-10-CM

## 2025-08-28 DIAGNOSIS — G43.709 CHRONIC MIGRAINE W/O AURA W/O STATUS MIGRAINOSUS, NOT INTRACTABLE: Chronic | ICD-10-CM

## 2025-08-28 DIAGNOSIS — R31.29 MICROSCOPIC HEMATURIA: Chronic | ICD-10-CM

## 2025-08-28 DIAGNOSIS — Z86.0100 HISTORY OF COLON POLYPS: Chronic | ICD-10-CM

## 2025-08-28 DIAGNOSIS — J30.1 NON-SEASONAL ALLERGIC RHINITIS DUE TO POLLEN: Chronic | ICD-10-CM

## 2025-08-28 DIAGNOSIS — H25.13 NUCLEAR SCLEROTIC CATARACT OF BOTH EYES: Chronic | ICD-10-CM

## 2025-08-28 DIAGNOSIS — M85.89 OSTEOPENIA OF MULTIPLE SITES: Chronic | ICD-10-CM

## 2025-08-28 DIAGNOSIS — Z12.31 BREAST CANCER SCREENING BY MAMMOGRAM: ICD-10-CM

## 2025-08-28 DIAGNOSIS — R79.89 ELEVATED SERUM CREATININE: ICD-10-CM

## 2025-08-28 DIAGNOSIS — Z91.09 MULTIPLE ENVIRONMENTAL ALLERGIES: Chronic | ICD-10-CM

## 2025-08-28 DIAGNOSIS — Z78.0 POSTMENOPAUSAL STATE: Chronic | ICD-10-CM

## 2025-08-28 DIAGNOSIS — E78.2 MIXED HYPERLIPIDEMIA: Primary | Chronic | ICD-10-CM

## 2025-08-28 LAB
ALBUMIN SERPL-MCNC: 4.3 G/DL (ref 3.5–5.2)
ALBUMIN/GLOB SERPL: 1.5 G/DL
ALP SERPL-CCNC: 50 U/L (ref 39–117)
ALT SERPL W P-5'-P-CCNC: 23 U/L (ref 1–33)
ANION GAP SERPL CALCULATED.3IONS-SCNC: 12 MMOL/L (ref 5–15)
AST SERPL-CCNC: 26 U/L (ref 1–32)
BILIRUB SERPL-MCNC: 1.7 MG/DL (ref 0–1.2)
BUN SERPL-MCNC: 15 MG/DL (ref 8–23)
BUN/CREAT SERPL: 15.5 (ref 7–25)
CALCIUM SPEC-SCNC: 9.5 MG/DL (ref 8.6–10.5)
CHLORIDE SERPL-SCNC: 98 MMOL/L (ref 98–107)
CO2 SERPL-SCNC: 28 MMOL/L (ref 22–29)
CREAT SERPL-MCNC: 0.97 MG/DL (ref 0.57–1)
EGFRCR SERPLBLD CKD-EPI 2021: 62.2 ML/MIN/1.73
GLOBULIN UR ELPH-MCNC: 2.8 GM/DL
GLUCOSE SERPL-MCNC: 95 MG/DL (ref 65–99)
METHYLMALONATE SERPL-SCNC: 183 NMOL/L (ref 0–378)
POTASSIUM SERPL-SCNC: 3.7 MMOL/L (ref 3.5–5.2)
PROT SERPL-MCNC: 7.1 G/DL (ref 6–8.5)
SODIUM SERPL-SCNC: 138 MMOL/L (ref 136–145)

## 2025-08-28 PROCEDURE — 80053 COMPREHEN METABOLIC PANEL: CPT | Performed by: INTERNAL MEDICINE

## 2025-08-28 PROCEDURE — 36415 COLL VENOUS BLD VENIPUNCTURE: CPT | Performed by: INTERNAL MEDICINE

## 2025-08-28 RX ORDER — ROSUVASTATIN CALCIUM 20 MG/1
TABLET, COATED ORAL
Qty: 90 TABLET | Refills: 3 | Status: SHIPPED | OUTPATIENT
Start: 2025-08-28

## (undated) DEVICE — SENSR O2 OXIMAX FNGR A/ 18IN NONSTR

## (undated) DEVICE — KT ORCA ORCAPOD DISP STRL

## (undated) DEVICE — LN SMPL CO2 SHTRM SD STREAM W/M LUER

## (undated) DEVICE — TUBING, SUCTION, 1/4" X 10', STRAIGHT: Brand: MEDLINE

## (undated) DEVICE — CANN O2 ETCO2 FITS ALL CONN CO2 SMPL A/ 7IN DISP LF

## (undated) DEVICE — SINGLE-USE BIOPSY FORCEPS: Brand: RADIAL JAW 4

## (undated) DEVICE — ADAPT CLN BIOGUARD AIR/H2O DISP